# Patient Record
Sex: FEMALE | Race: OTHER
[De-identification: names, ages, dates, MRNs, and addresses within clinical notes are randomized per-mention and may not be internally consistent; named-entity substitution may affect disease eponyms.]

---

## 2017-07-20 ENCOUNTER — APPOINTMENT (OUTPATIENT)
Dept: PULMONOLOGY | Facility: CLINIC | Age: 72
End: 2017-07-20

## 2017-07-20 DIAGNOSIS — J18.9 PNEUMONIA, UNSPECIFIED ORGANISM: ICD-10-CM

## 2017-10-03 ENCOUNTER — OTHER (OUTPATIENT)
Age: 72
End: 2017-10-03

## 2017-10-12 ENCOUNTER — APPOINTMENT (OUTPATIENT)
Dept: PULMONOLOGY | Facility: CLINIC | Age: 72
End: 2017-10-12

## 2017-10-27 ENCOUNTER — APPOINTMENT (OUTPATIENT)
Dept: PULMONOLOGY | Facility: CLINIC | Age: 72
End: 2017-10-27
Payer: MEDICARE

## 2017-10-27 VITALS
WEIGHT: 125 LBS | OXYGEN SATURATION: 93 % | HEIGHT: 66 IN | TEMPERATURE: 97.4 F | DIASTOLIC BLOOD PRESSURE: 72 MMHG | BODY MASS INDEX: 20.09 KG/M2 | SYSTOLIC BLOOD PRESSURE: 100 MMHG | HEART RATE: 86 BPM

## 2017-10-27 DIAGNOSIS — J30.9 ALLERGIC RHINITIS, UNSPECIFIED: ICD-10-CM

## 2017-10-27 PROCEDURE — 94010 BREATHING CAPACITY TEST: CPT

## 2017-10-27 PROCEDURE — 71020: CPT

## 2017-10-27 PROCEDURE — 99214 OFFICE O/P EST MOD 30 MIN: CPT | Mod: 25

## 2018-01-08 ENCOUNTER — APPOINTMENT (OUTPATIENT)
Dept: PULMONOLOGY | Facility: CLINIC | Age: 73
End: 2018-01-08

## 2018-06-07 ENCOUNTER — MESSAGE (OUTPATIENT)
Age: 73
End: 2018-06-07

## 2018-06-08 ENCOUNTER — APPOINTMENT (OUTPATIENT)
Dept: PULMONOLOGY | Facility: CLINIC | Age: 73
End: 2018-06-08
Payer: MEDICARE

## 2018-06-08 VITALS
OXYGEN SATURATION: 93 % | SYSTOLIC BLOOD PRESSURE: 100 MMHG | HEART RATE: 78 BPM | DIASTOLIC BLOOD PRESSURE: 70 MMHG | BODY MASS INDEX: 19.29 KG/M2 | WEIGHT: 120 LBS | HEIGHT: 66 IN | TEMPERATURE: 97.6 F

## 2018-06-08 DIAGNOSIS — J47.1 BRONCHIECTASIS WITH (ACUTE) EXACERBATION: ICD-10-CM

## 2018-06-08 PROCEDURE — 94010 BREATHING CAPACITY TEST: CPT

## 2018-06-08 PROCEDURE — 99214 OFFICE O/P EST MOD 30 MIN: CPT | Mod: 25

## 2018-06-08 RX ORDER — LEVOFLOXACIN 500 MG/1
500 TABLET, FILM COATED ORAL
Qty: 7 | Refills: 0 | Status: DISCONTINUED | COMMUNITY
Start: 2017-07-14 | End: 2018-06-08

## 2018-06-08 RX ORDER — AZITHROMYCIN 250 MG/1
250 TABLET, FILM COATED ORAL
Qty: 6 | Refills: 0 | Status: DISCONTINUED | COMMUNITY
Start: 2017-12-30 | End: 2018-06-08

## 2018-06-12 ENCOUNTER — MESSAGE (OUTPATIENT)
Age: 73
End: 2018-06-12

## 2018-07-11 ENCOUNTER — APPOINTMENT (OUTPATIENT)
Dept: PULMONOLOGY | Facility: CLINIC | Age: 73
End: 2018-07-11
Payer: MEDICARE

## 2018-07-11 VITALS
TEMPERATURE: 98.9 F | OXYGEN SATURATION: 92 % | SYSTOLIC BLOOD PRESSURE: 82 MMHG | HEART RATE: 92 BPM | BODY MASS INDEX: 19.29 KG/M2 | WEIGHT: 120 LBS | DIASTOLIC BLOOD PRESSURE: 64 MMHG | HEIGHT: 66 IN

## 2018-07-11 PROCEDURE — 94010 BREATHING CAPACITY TEST: CPT

## 2018-07-11 PROCEDURE — 71046 X-RAY EXAM CHEST 2 VIEWS: CPT

## 2018-07-11 PROCEDURE — 99214 OFFICE O/P EST MOD 30 MIN: CPT | Mod: 25

## 2018-07-11 RX ORDER — ALBUTEROL SULFATE 90 UG/1
108 (90 BASE) AEROSOL, METERED RESPIRATORY (INHALATION)
Qty: 18 | Refills: 0 | Status: DISCONTINUED | COMMUNITY
Start: 2017-12-30 | End: 2018-07-11

## 2018-07-11 RX ORDER — LORATADINE 5 MG/5 ML
10-240 SOLUTION, ORAL ORAL DAILY
Qty: 30 | Refills: 1 | Status: DISCONTINUED | COMMUNITY
Start: 2017-10-27 | End: 2018-07-11

## 2018-07-11 RX ORDER — OXYCODONE AND ACETAMINOPHEN 5; 325 MG/1; MG/1
5-325 TABLET ORAL
Qty: 60 | Refills: 0 | Status: DISCONTINUED | COMMUNITY
Start: 2018-04-20 | End: 2018-07-11

## 2018-07-11 RX ORDER — LEVOFLOXACIN 500 MG/1
500 TABLET, FILM COATED ORAL DAILY
Qty: 7 | Refills: 0 | Status: DISCONTINUED | COMMUNITY
Start: 2018-06-07 | End: 2018-07-11

## 2018-07-11 RX ORDER — TRIAMCINOLONE ACETONIDE 1 MG/G
0.1 CREAM TOPICAL
Qty: 30 | Refills: 0 | Status: DISCONTINUED | COMMUNITY
Start: 2018-01-09 | End: 2018-07-11

## 2018-07-11 RX ORDER — HYDROCODONE BITARTRATE AND HOMATROPINE METHYLBROMIDE 5; 1.5 MG/5ML; MG/5ML
5-1.5 SOLUTION ORAL
Qty: 120 | Refills: 0 | Status: DISCONTINUED | COMMUNITY
Start: 2017-10-27 | End: 2018-07-11

## 2018-07-11 RX ORDER — ALCLOMETASONE DIPROPIONATE 0.5 MG/G
0.05 CREAM TOPICAL
Qty: 30 | Refills: 0 | Status: DISCONTINUED | COMMUNITY
Start: 2017-12-21 | End: 2018-07-11

## 2018-07-11 RX ORDER — SODIUM FLUORIDE 6 MG/ML
1.1 PASTE, DENTIFRICE DENTAL
Qty: 100 | Refills: 0 | Status: DISCONTINUED | COMMUNITY
Start: 2017-06-06 | End: 2018-07-11

## 2018-07-11 RX ORDER — NAPROXEN 500 MG/1
500 TABLET ORAL
Qty: 30 | Refills: 0 | Status: DISCONTINUED | COMMUNITY
Start: 2018-04-24 | End: 2018-07-11

## 2018-07-12 ENCOUNTER — TRANSCRIPTION ENCOUNTER (OUTPATIENT)
Age: 73
End: 2018-07-12

## 2018-07-12 ENCOUNTER — APPOINTMENT (OUTPATIENT)
Dept: PULMONOLOGY | Facility: CLINIC | Age: 73
End: 2018-07-12
Payer: MEDICARE

## 2018-07-12 PROCEDURE — 94729 DIFFUSING CAPACITY: CPT

## 2018-07-12 PROCEDURE — 94727 GAS DIL/WSHOT DETER LNG VOL: CPT

## 2018-07-12 PROCEDURE — 94060 EVALUATION OF WHEEZING: CPT

## 2018-08-06 ENCOUNTER — APPOINTMENT (OUTPATIENT)
Dept: PULMONOLOGY | Facility: CLINIC | Age: 73
End: 2018-08-06
Payer: MEDICARE

## 2018-08-06 PROCEDURE — 94640 AIRWAY INHALATION TREATMENT: CPT

## 2018-08-23 ENCOUNTER — APPOINTMENT (OUTPATIENT)
Dept: PULMONOLOGY | Facility: CLINIC | Age: 73
End: 2018-08-23
Payer: MEDICARE

## 2018-08-23 VITALS
OXYGEN SATURATION: 90 % | HEART RATE: 80 BPM | DIASTOLIC BLOOD PRESSURE: 70 MMHG | BODY MASS INDEX: 19.29 KG/M2 | SYSTOLIC BLOOD PRESSURE: 100 MMHG | TEMPERATURE: 97.7 F | WEIGHT: 120 LBS | HEIGHT: 66 IN

## 2018-08-23 PROCEDURE — 99213 OFFICE O/P EST LOW 20 MIN: CPT | Mod: 25

## 2018-08-23 PROCEDURE — 94640 AIRWAY INHALATION TREATMENT: CPT | Mod: 59

## 2018-08-23 PROCEDURE — 94010 BREATHING CAPACITY TEST: CPT

## 2018-08-24 ENCOUNTER — LABORATORY RESULT (OUTPATIENT)
Age: 73
End: 2018-08-24

## 2018-08-28 ENCOUNTER — RX RENEWAL (OUTPATIENT)
Age: 73
End: 2018-08-28

## 2018-08-28 LAB — BACTERIA SPT CULT: ABNORMAL

## 2018-09-06 LAB — FUNGUS SPT CULT: ABNORMAL

## 2018-09-24 LAB — FUNGUS SPT CULT: NORMAL

## 2018-10-03 ENCOUNTER — CLINICAL ADVICE (OUTPATIENT)
Age: 73
End: 2018-10-03

## 2018-10-03 LAB — ACID FAST STN SPT: ABNORMAL

## 2018-10-15 LAB — ACID FAST STN SPT: NORMAL

## 2018-11-02 ENCOUNTER — APPOINTMENT (OUTPATIENT)
Dept: PULMONOLOGY | Facility: CLINIC | Age: 73
End: 2018-11-02
Payer: MEDICARE

## 2018-11-02 VITALS
OXYGEN SATURATION: 93 % | WEIGHT: 120 LBS | DIASTOLIC BLOOD PRESSURE: 80 MMHG | TEMPERATURE: 98.2 F | HEIGHT: 66 IN | HEART RATE: 80 BPM | SYSTOLIC BLOOD PRESSURE: 100 MMHG | BODY MASS INDEX: 19.29 KG/M2

## 2018-11-02 PROCEDURE — 99213 OFFICE O/P EST LOW 20 MIN: CPT | Mod: 25

## 2018-11-02 PROCEDURE — 71046 X-RAY EXAM CHEST 2 VIEWS: CPT

## 2018-11-27 ENCOUNTER — OUTPATIENT (OUTPATIENT)
Dept: OUTPATIENT SERVICES | Facility: HOSPITAL | Age: 73
LOS: 1 days | Discharge: HOME | End: 2018-11-27
Payer: MEDICARE

## 2018-11-27 ENCOUNTER — RESULT REVIEW (OUTPATIENT)
Age: 73
End: 2018-11-27

## 2018-11-27 PROCEDURE — 31624 DX BRONCHOSCOPE/LAVAGE: CPT

## 2018-11-27 PROCEDURE — 87116 MYCOBACTERIA CULTURE: CPT

## 2018-11-27 PROCEDURE — 87015 SPECIMEN INFECT AGNT CONCNTJ: CPT

## 2018-11-27 PROCEDURE — 87206 SMEAR FLUORESCENT/ACID STAI: CPT

## 2018-11-27 PROCEDURE — 87070 CULTURE OTHR SPECIMN AEROBIC: CPT

## 2018-11-27 PROCEDURE — 88305 TISSUE EXAM BY PATHOLOGIST: CPT

## 2018-11-27 PROCEDURE — 89051 BODY FLUID CELL COUNT: CPT

## 2018-11-27 PROCEDURE — 88112 CYTOPATH CELL ENHANCE TECH: CPT

## 2018-11-28 LAB
B PERT IGG+IGM PNL SER: SIGNIFICANT CHANGE UP
COLOR FLD: SIGNIFICANT CHANGE UP
FLUID INTAKE SUBSTANCE CLASS: SIGNIFICANT CHANGE UP
FLUID SEGMENTED GRANULOCYTES: 67 % — SIGNIFICANT CHANGE UP
LYMPHOCYTES # FLD: 13 % — SIGNIFICANT CHANGE UP
MONOS+MACROS # FLD: 20 % — SIGNIFICANT CHANGE UP
NIGHT BLUE STAIN TISS: SIGNIFICANT CHANGE UP
NIGHT BLUE STAIN TISS: SIGNIFICANT CHANGE UP
RCV VOL RI: HIGH /UL (ref 0–5)
SPECIMEN SOURCE FLD: SIGNIFICANT CHANGE UP
SPECIMEN SOURCE: SIGNIFICANT CHANGE UP
SPECIMEN SOURCE: SIGNIFICANT CHANGE UP
TOTAL NUCLEATED CELL COUNT, BODY FLUID: 2500 /UL — HIGH (ref 0–5)
TUBE TYPE: SIGNIFICANT CHANGE UP

## 2018-11-29 LAB
-  AMOXICILLIN/CLAVULANIC ACID: SIGNIFICANT CHANGE UP
-  CIPROFLOXACIN: SIGNIFICANT CHANGE UP
-  TRIMETHOPRIM/SULFAMETHOXAZOLE: SIGNIFICANT CHANGE UP
CULTURE RESULTS: SIGNIFICANT CHANGE UP
CULTURE RESULTS: SIGNIFICANT CHANGE UP
GRAM STN FLD: SIGNIFICANT CHANGE UP
GRAM STN FLD: SIGNIFICANT CHANGE UP
METHOD TYPE: SIGNIFICANT CHANGE UP
METHOD TYPE: SIGNIFICANT CHANGE UP
NON-GYNECOLOGICAL CYTOLOGY STUDY: SIGNIFICANT CHANGE UP
NON-GYNECOLOGICAL CYTOLOGY STUDY: SIGNIFICANT CHANGE UP
ORGANISM # SPEC MICROSCOPIC CNT: SIGNIFICANT CHANGE UP
SPECIMEN SOURCE: SIGNIFICANT CHANGE UP
SPECIMEN SOURCE: SIGNIFICANT CHANGE UP

## 2018-11-30 ENCOUNTER — CLINICAL ADVICE (OUTPATIENT)
Age: 73
End: 2018-11-30

## 2018-12-14 ENCOUNTER — APPOINTMENT (OUTPATIENT)
Dept: PULMONOLOGY | Facility: CLINIC | Age: 73
End: 2018-12-14
Payer: MEDICARE

## 2018-12-14 VITALS
HEIGHT: 66 IN | DIASTOLIC BLOOD PRESSURE: 68 MMHG | TEMPERATURE: 97.9 F | WEIGHT: 104.25 LBS | SYSTOLIC BLOOD PRESSURE: 94 MMHG | BODY MASS INDEX: 16.76 KG/M2 | OXYGEN SATURATION: 93 % | HEART RATE: 80 BPM

## 2018-12-14 PROCEDURE — 36415 COLL VENOUS BLD VENIPUNCTURE: CPT

## 2018-12-14 PROCEDURE — 99214 OFFICE O/P EST MOD 30 MIN: CPT | Mod: 25

## 2018-12-14 PROCEDURE — 71046 X-RAY EXAM CHEST 2 VIEWS: CPT

## 2018-12-14 RX ORDER — AMOXICILLIN AND CLAVULANATE POTASSIUM 875; 125 MG/1; MG/1
875-125 TABLET, COATED ORAL
Qty: 28 | Refills: 0 | Status: DISCONTINUED | COMMUNITY
Start: 2018-11-30 | End: 2018-12-14

## 2018-12-17 LAB
ALBUMIN SERPL ELPH-MCNC: 4 G/DL
ALP BLD-CCNC: 53 U/L
ALT SERPL-CCNC: 18 U/L
ANION GAP SERPL CALC-SCNC: 11 MMOL/L
AST SERPL-CCNC: 25 U/L
BASOPHILS # BLD AUTO: 0.03 K/UL
BASOPHILS NFR BLD AUTO: 0.6 %
BILIRUB SERPL-MCNC: 0.3 MG/DL
BUN SERPL-MCNC: 24 MG/DL
CALCIUM SERPL-MCNC: 9.6 MG/DL
CHLORIDE SERPL-SCNC: 107 MMOL/L
CO2 SERPL-SCNC: 26 MMOL/L
CREAT SERPL-MCNC: 0.79 MG/DL
EOSINOPHIL # BLD AUTO: 0.08 K/UL
EOSINOPHIL NFR BLD AUTO: 1.6 %
GLUCOSE SERPL-MCNC: 61 MG/DL
HCT VFR BLD CALC: 40.7 %
HGB BLD-MCNC: 12.6 G/DL
IMM GRANULOCYTES NFR BLD AUTO: 0.2 %
LYMPHOCYTES # BLD AUTO: 1.85 K/UL
LYMPHOCYTES NFR BLD AUTO: 36.6 %
MAN DIFF?: NORMAL
MCHC RBC-ENTMCNC: 27.8 PG
MCHC RBC-ENTMCNC: 31 GM/DL
MCV RBC AUTO: 89.6 FL
MONOCYTES # BLD AUTO: 0.37 K/UL
MONOCYTES NFR BLD AUTO: 7.3 %
NEUTROPHILS # BLD AUTO: 2.72 K/UL
NEUTROPHILS NFR BLD AUTO: 53.7 %
PLATELET # BLD AUTO: 388 K/UL
POTASSIUM SERPL-SCNC: 4.5 MMOL/L
PROT SERPL-MCNC: 6.8 G/DL
RBC # BLD: 4.54 M/UL
RBC # FLD: 15.5 %
SODIUM SERPL-SCNC: 144 MMOL/L
WBC # FLD AUTO: 5.06 K/UL

## 2018-12-18 ENCOUNTER — RX RENEWAL (OUTPATIENT)
Age: 73
End: 2018-12-18

## 2019-01-15 LAB
CULTURE RESULTS: SIGNIFICANT CHANGE UP
CULTURE RESULTS: SIGNIFICANT CHANGE UP
SPECIMEN SOURCE: SIGNIFICANT CHANGE UP
SPECIMEN SOURCE: SIGNIFICANT CHANGE UP

## 2019-01-25 ENCOUNTER — APPOINTMENT (OUTPATIENT)
Dept: PULMONOLOGY | Facility: CLINIC | Age: 74
End: 2019-01-25
Payer: MEDICARE

## 2019-01-25 VITALS
SYSTOLIC BLOOD PRESSURE: 100 MMHG | HEART RATE: 76 BPM | HEIGHT: 66 IN | BODY MASS INDEX: 16.71 KG/M2 | WEIGHT: 104 LBS | DIASTOLIC BLOOD PRESSURE: 80 MMHG | OXYGEN SATURATION: 93 % | TEMPERATURE: 97.8 F

## 2019-01-25 PROCEDURE — 94010 BREATHING CAPACITY TEST: CPT

## 2019-01-25 PROCEDURE — 71046 X-RAY EXAM CHEST 2 VIEWS: CPT

## 2019-01-25 PROCEDURE — 36415 COLL VENOUS BLD VENIPUNCTURE: CPT

## 2019-01-25 PROCEDURE — 99214 OFFICE O/P EST MOD 30 MIN: CPT | Mod: 25

## 2019-01-25 NOTE — HISTORY OF PRESENT ILLNESS
[FreeTextEntry1] : 5/26/15: Ms. Resendez comes in for routine checkup. She endorses worsened allergy symptoms, particularly ocular and nasal symptoms. She denies any dyspnea, cough, or sputum production. She has an Acapella valve which she is not really using and doesn't remember how to use. However, she relates that when she uses it she does not really bring up any sputum.\par \par 10/14/15: Here for follow up after chest CT. Last month a little more dyspneic. No cough or sputum. But used to walk 2.5 miles and can't do that as easily any more. Otherwise doing well. Stopped using Acapella as didn't produce any sputum with it.\par \par 16: Was fine and then on Saturday suddenly coughed up 1/4-1/3 cup of bright red blood total throughout the evening. No fever, dyspnea, chest pain. No further hemoptysis but has continued to cough. Bringing up just scant phlegm with that cough. Feels a little congested but otherwise not sick, just a little congested, no fever, no chest pain, no dyspnea. BP low - eating and drinking fine, no N/V/D. Gave her cefdinir for exacerbation of bronchiectasis.\par \par 16: Saturday developed cough, sensation of phlegm she couldn't expectorate, says slight fever at [facility], no chest pain, dyspnea, + fatigue. Went to [facility] where CXR read as lingula/retrocardiac infiltrate. Put on Levaquin 500mg x 7 days, feeling better.\par \par 10/31/16: Comes in for follow up after recent CT. No clinical change. No significant cough or dyspnea. Rhinorrhea.\par \par 17: Seen and examined by me w Dr Keith. Developed mild sore throat and nonproductive cough ~7/10, saw [facility]  and given clinical dx of pneumonia without CXR, given Levaquin x 7 days. Cough resolved but continues to feel fatigued. Appetite improving. No fever, chest pain, dyspnea. Chest exam unchanged - scattered squeaks. Whether or not she had pneumonia, she was treated for bacterial pathogens and is improving. Advised to give it more time. Call if worsening/fever/dyspnea/etc. Due for CT in 10/2017.\par \par 10/27/17: Last 3 weeks dry cough, no sputum, no fever. Feels well. Mom  2 weeks ago. And  just got a PPM emergently for bradycardia. May allergies but not fall. On Robitussin. Mild nasal symptoms just w weather.\par \par 18: Called yesterday with nocturnal hemoptysis for 2 nights (now 3 nights). Coughing up about a tablespoon of blood at a time only when she lies down. No new daytime cough or sputum production. Mildly fatigued initially but getting worse as she has trouble sleeping. No fever. No orthopnea. Started Levaquin yesterday.\par \par 18: Hemoptysis resolved on the Levaquin but she remains very fatigued and more dyspneic than usual. Was able to travel to [facility] w grandchild but took it easy. No fever. No chest pain.\par \par 18: In the interim she had a repeat scan and came in for sputum induction. She did not improve on the Breo. She comes in today still w dyspnea on 4-5 blocks. She is fatigued. She has lost 10 lbs / 2 weeks despite maintaining same eating habits. Slightly more constipated than usual, no blood, UTD on cscope. Cough is minimal. -- Grew Pseudomonas and tried treating w Levaquin but there was no improvement and in fact she felt markedly worse on Levaquin.\par \par 18: Losing weight despite eating ice cream, down about 15 lbs. Remains dyspneic, really fatigued, has to sit and rest to bake. Found to have a breast papilloma on ultrasound and needs to have it removed - I spoke to Dr Reed. This is next Wednesday, Dr Darwin Garcia at [facility]. Continues to have this rippling, tingling feeling in her scalp that she has reported to me before. 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 The Augmentin seemed to help, less cough in past couple of days, more clear headed. Lost another lb. Generally a bit better. But down 20 lbs since we met and 15 this year.\par \par 19: Started on . Feels no better. Some inconsistent GI side effects. Remains very fatigued. Eating. Weight is stable for this month. No rash or vision change. Notes a color change to her toes.

## 2019-01-25 NOTE — CONSULT LETTER
[Dear  ___] : Dear  [unfilled], [Courtesy Letter:] : I had the pleasure of seeing your patient, [unfilled], in my office today. [Please see my note below.] : Please see my note below. [Consult Closing:] : Thank you very much for allowing me to participate in the care of this patient.  If you have any questions, please do not hesitate to contact me. [Sincerely,] : Sincerely, [FreeTextEntry2] : Rajinder Baez MD\par [address]\[place] [address] [FreeTextEntry3] : Radha Ramos MD [DrKali  ___] : Dr. DUPREE

## 2019-01-25 NOTE — ASSESSMENT
[FreeTextEntry1] : Data reviewed: \par \par CT chest [facility] 7/17/18: 1. New confluent opacity in the lower lobes, right greater than left. This could represent new atelectasis/mucoid impaction. A follow-up noncontrast CT of the chest in 3-6 months is recommended to ensure stability. 2. Increasing tree-in-bud nodules in right lower lobe. 3. Stable opacity in superior segment of right lower lobe.\par \par PA/lat CXR in office 12/14/18: diffusely increased markings but no sig change \par PA/lat CXR in office 1/25/19: no sig change, slightly better at R base if anything\par \par Spirometry always restricted\par PFT 7/12/18: severe complex restrictive abnormality, no BD response, DLCO 60%\par [place] 1/25/19: restricted, FEV1 33%\par \par SpCx 8/6/18: bacterial cancelled / rare Penicillium sp & numerous Paecilomyces variotii / rare MAC\par SpCx 8/23/18: AFB neg\par SpCx 8/24/18: mixed GNR incl Pseudomonas putis /  fungus neg / AFB neg\par Bronch 11/27/18: 67% polys, 13% lymphs / >100k Moraxella in BAL / MAC in BAL and BW\par \par Impression:\par 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 \par Continue follow up w audiology and opthalmology. Notes appreciated.\par Feet - does not appear to be a drug reaction - Raynaud's? Can send serologies next time.\par RTO 1 month, call if worsening.

## 2019-01-25 NOTE — PHYSICAL EXAM
[General Appearance - In No Acute Distress] : no acute distress [Heart Sounds] : normal S1 and S2 [Murmurs] : no murmurs present [Edema] : no peripheral edema present [FreeTextEntry1] : toes are purplish-red, blanching, pulses are intact, feet cool but toes not cold, no sloughing

## 2019-01-28 LAB
ALBUMIN SERPL ELPH-MCNC: 4.1 G/DL
ALBUMIN SERPL ELPH-MCNC: 4.3 G/DL
ALP BLD-CCNC: 48 U/L
ALP BLD-CCNC: 50 U/L
ALT SERPL-CCNC: 14 U/L
ALT SERPL-CCNC: 18 U/L
ANION GAP SERPL CALC-SCNC: 12 MMOL/L
AST SERPL-CCNC: 23 U/L
AST SERPL-CCNC: 24 U/L
BASOPHILS # BLD AUTO: 0.03 K/UL
BASOPHILS NFR BLD AUTO: 0.5 %
BILIRUB DIRECT SERPL-MCNC: 0.1 MG/DL
BILIRUB INDIRECT SERPL-MCNC: 0.2 MG/DL
BILIRUB SERPL-MCNC: 0.2 MG/DL
BILIRUB SERPL-MCNC: 0.2 MG/DL
BUN SERPL-MCNC: 18 MG/DL
CALCIUM SERPL-MCNC: 9.4 MG/DL
CHLORIDE SERPL-SCNC: 104 MMOL/L
CO2 SERPL-SCNC: 25 MMOL/L
CREAT SERPL-MCNC: 0.68 MG/DL
EOSINOPHIL # BLD AUTO: 0.02 K/UL
EOSINOPHIL NFR BLD AUTO: 0.4 %
GLUCOSE SERPL-MCNC: 91 MG/DL
HCT VFR BLD CALC: 44.8 %
HGB BLD-MCNC: 14.4 G/DL
IMM GRANULOCYTES NFR BLD AUTO: 0 %
LYMPHOCYTES # BLD AUTO: 1.38 K/UL
LYMPHOCYTES NFR BLD AUTO: 24.6 %
MAN DIFF?: NORMAL
MCHC RBC-ENTMCNC: 29.3 PG
MCHC RBC-ENTMCNC: 32.1 GM/DL
MCV RBC AUTO: 91.2 FL
MONOCYTES # BLD AUTO: 0.39 K/UL
MONOCYTES NFR BLD AUTO: 7 %
NEUTROPHILS # BLD AUTO: 3.79 K/UL
NEUTROPHILS NFR BLD AUTO: 67.5 %
PLATELET # BLD AUTO: 243 K/UL
POTASSIUM SERPL-SCNC: 4.8 MMOL/L
PROT SERPL-MCNC: 6.9 G/DL
PROT SERPL-MCNC: 7 G/DL
RBC # BLD: 4.91 M/UL
RBC # FLD: 16.5 %
SODIUM SERPL-SCNC: 141 MMOL/L
TSH SERPL-ACNC: 3.94 UIU/ML
WBC # FLD AUTO: 5.61 K/UL

## 2019-02-25 ENCOUNTER — APPOINTMENT (OUTPATIENT)
Dept: PULMONOLOGY | Facility: CLINIC | Age: 74
End: 2019-02-25
Payer: MEDICARE

## 2019-02-25 VITALS
TEMPERATURE: 97.7 F | WEIGHT: 107 LBS | BODY MASS INDEX: 17.19 KG/M2 | HEART RATE: 71 BPM | HEIGHT: 66 IN | OXYGEN SATURATION: 93 % | DIASTOLIC BLOOD PRESSURE: 74 MMHG | SYSTOLIC BLOOD PRESSURE: 98 MMHG

## 2019-02-25 PROCEDURE — 99213 OFFICE O/P EST LOW 20 MIN: CPT | Mod: 25

## 2019-02-25 PROCEDURE — 36415 COLL VENOUS BLD VENIPUNCTURE: CPT

## 2019-02-25 PROCEDURE — 71046 X-RAY EXAM CHEST 2 VIEWS: CPT

## 2019-02-25 NOTE — REVIEW OF SYSTEMS
[Fatigue] : fatigue [Recent Wt Gain (___ Lbs)] : recent [unfilled] ~Ulb weight gain [As Noted in HPI] : as noted in HPI [Negative] : Cardiovascular

## 2019-02-25 NOTE — HISTORY OF PRESENT ILLNESS
[FreeTextEntry1] : 5/26/15: Ms. Resendez comes in for routine checkup. She endorses worsened allergy symptoms, particularly ocular and nasal symptoms. She denies any dyspnea, cough, or sputum production. She has an Acapella valve which she is not really using and doesn't remember how to use. However, she relates that when she uses it she does not really bring up any sputum.\par \par 10/14/15: Here for follow up after chest CT. Last month a little more dyspneic. No cough or sputum. But used to walk 2.5 miles and can't do that as easily any more. Otherwise doing well. Stopped using Acapella as didn't produce any sputum with it.\par \par 16: Was fine and then on Saturday suddenly coughed up 1/4-1/3 cup of bright red blood total throughout the evening. No fever, dyspnea, chest pain. No further hemoptysis but has continued to cough. Bringing up just scant phlegm with that cough. Feels a little congested but otherwise not sick, just a little congested, no fever, no chest pain, no dyspnea. BP low - eating and drinking fine, no N/V/D. Gave her cefdinir for exacerbation of bronchiectasis.\par \par 16: Saturday developed cough, sensation of phlegm she couldn't expectorate, says slight fever at [facility], no chest pain, dyspnea, + fatigue. Went to [facility] where CXR read as lingula/retrocardiac infiltrate. Put on Levaquin 500mg x 7 days, feeling better.\par \par 10/31/16: Comes in for follow up after recent CT. No clinical change. No significant cough or dyspnea. Rhinorrhea.\par \par 17: Seen and examined by me w Dr Keith. Developed mild sore throat and nonproductive cough ~7/10, saw [facility]  and given clinical dx of pneumonia without CXR, given Levaquin x 7 days. Cough resolved but continues to feel fatigued. Appetite improving. No fever, chest pain, dyspnea. Chest exam unchanged - scattered squeaks. Whether or not she had pneumonia, she was treated for bacterial pathogens and is improving. Advised to give it more time. Call if worsening/fever/dyspnea/etc. Due for CT in 10/2017.\par \par 10/27/17: Last 3 weeks dry cough, no sputum, no fever. Feels well. Mom  2 weeks ago. And  just got a PPM emergently for bradycardia. May allergies but not fall. On Robitussin. Mild nasal symptoms just w weather.\par \par 18: Called yesterday with nocturnal hemoptysis for 2 nights (now 3 nights). Coughing up about a tablespoon of blood at a time only when she lies down. No new daytime cough or sputum production. Mildly fatigued initially but getting worse as she has trouble sleeping. No fever. No orthopnea. Started Levaquin yesterday.\par \par 18: Hemoptysis resolved on the Levaquin but she remains very fatigued and more dyspneic than usual. Was able to travel to [facility] w grandchild but took it easy. No fever. No chest pain.\par \par 18: In the interim she had a repeat scan and came in for sputum induction. She did not improve on the Breo. She comes in today still w dyspnea on 4-5 blocks. She is fatigued. She has lost 10 lbs / 2 weeks despite maintaining same eating habits. Slightly more constipated than usual, no blood, UTD on cscope. Cough is minimal. -- Grew Pseudomonas and tried treating w Levaquin but there was no improvement and in fact she felt markedly worse on Levaquin.\par \par 18: Losing weight despite eating ice cream, down about 15 lbs. Remains dyspneic, really fatigued, has to sit and rest to bake. Found to have a breast papilloma on ultrasound and needs to have it removed - I spoke to Dr Reed. This is next Wednesday, Dr Darwin Garcia at [facility]. Continues to have this rippling, tingling feeling in her scalp that she has reported to me before. 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 The Augmentin seemed to help, less cough in past couple of days, more clear headed. Lost another lb. Generally a bit better. But down 20 lbs since we met and 15 this year.\par \par 19: Started on . Feels no better. Some inconsistent GI side effects. Remains very fatigued. Eating. Weight is stable for this month. No rash or vision change. Notes a color change to her toes.\par \par 19: 2-3 days of feeling like herself, so minimally better than last time. Some blotches on skin, feet still red. And dentist notices more plaque on teeth. Has gained 5 lbs, 102 to 107 lbs.

## 2019-02-25 NOTE — ASSESSMENT
[FreeTextEntry1] : Data reviewed: \par \par CT chest [facility] 7/17/18: 1. New confluent opacity in the lower lobes, right greater than left. This could represent new atelectasis/mucoid impaction. A follow-up noncontrast CT of the chest in 3-6 months is recommended to ensure stability. 2. Increasing tree-in-bud nodules in right lower lobe. 3. Stable opacity in superior segment of right lower lobe.\par \par PA/lat CXR in office 12/14/18: diffusely increased markings but no sig change \par PA/lat CXR in office 1/25/19: no sig change, slightly better at R base if anything\par PA/lat CXR in office 2/25/19: again slightly better at R base, no sig change\par \par Spirometry always restricted\par PFT 7/12/18: severe complex restrictive abnormality, no BD response, DLCO 60%\par [place] 1/25/19: restricted, FEV1 33%\par \par SpCx 8/6/18: bacterial cancelled / rare Penicillium sp & numerous Paecilomyces variotii / rare MAC\par SpCx 8/23/18: AFB neg\par SpCx 8/24/18: mixed GNR incl Pseudomonas putis /  fungus neg / AFB neg\par Bronch 11/27/18: 67% polys, 13% lymphs / >100k Moraxella in BAL / MAC in BAL and BW\par SpCx 1/25/19: AFB neg to date\par \par Impression:\par Bronchiectasis w MAC on tiwk tx since 12/21/18\par \par Plan:\par Continue tiwk MAC tx as follows:\par -azithromycin 500 mg tiwk\par -rifampin 600 mg tiwk\par -ethambutol 25 mg/kg tiwk / 104 lbs = 47kg / 1200 mg\par Labs checked today incl UZIEL. \par There is no drug reaction between alendronate and her MAC regimen.\par The rash I do not think is drug eruption. She sees Dr Caitlin Hewitt - asked her to follow up w her, but continue tx.\par Cont ophtho, audiology follow up.\par RTO 1 month, call if worsening.

## 2019-02-25 NOTE — PHYSICAL EXAM
[General Appearance - In No Acute Distress] : no acute distress [Heart Sounds] : normal S1 and S2 [Murmurs] : no murmurs present [Edema] : no peripheral edema present [Abdomen Tenderness] : non-tender [FreeTextEntry1] : some dry patches on arms

## 2019-02-26 LAB
ALBUMIN SERPL ELPH-MCNC: 4.3 G/DL
ALP BLD-CCNC: 48 U/L
ALT SERPL-CCNC: 21 U/L
ANION GAP SERPL CALC-SCNC: 12 MMOL/L
AST SERPL-CCNC: 27 U/L
BASOPHILS # BLD AUTO: 0.04 K/UL
BASOPHILS NFR BLD AUTO: 0.8 %
BILIRUB SERPL-MCNC: 0.2 MG/DL
BUN SERPL-MCNC: 19 MG/DL
CALCIUM SERPL-MCNC: 9.8 MG/DL
CHLORIDE SERPL-SCNC: 103 MMOL/L
CO2 SERPL-SCNC: 26 MMOL/L
CREAT SERPL-MCNC: 0.72 MG/DL
EOSINOPHIL # BLD AUTO: 0.06 K/UL
EOSINOPHIL NFR BLD AUTO: 1.3 %
GLUCOSE SERPL-MCNC: 92 MG/DL
HCT VFR BLD CALC: 43.7 %
HGB BLD-MCNC: 13.7 G/DL
IMM GRANULOCYTES NFR BLD AUTO: 0.2 %
LYMPHOCYTES # BLD AUTO: 1.48 K/UL
LYMPHOCYTES NFR BLD AUTO: 31.1 %
MAN DIFF?: NORMAL
MCHC RBC-ENTMCNC: 28.6 PG
MCHC RBC-ENTMCNC: 31.4 GM/DL
MCV RBC AUTO: 91.2 FL
MONOCYTES # BLD AUTO: 0.32 K/UL
MONOCYTES NFR BLD AUTO: 6.7 %
NEUTROPHILS # BLD AUTO: 2.85 K/UL
NEUTROPHILS NFR BLD AUTO: 59.9 %
PLATELET # BLD AUTO: 213 K/UL
POTASSIUM SERPL-SCNC: 4.2 MMOL/L
PROT SERPL-MCNC: 6.8 G/DL
RBC # BLD: 4.79 M/UL
RBC # FLD: 15.4 %
SODIUM SERPL-SCNC: 141 MMOL/L
WBC # FLD AUTO: 4.76 K/UL

## 2019-02-28 LAB — ANA SER IF-ACNC: NEGATIVE

## 2019-03-25 LAB — ACID FAST STN SPT: NORMAL

## 2019-03-31 ENCOUNTER — TRANSCRIPTION ENCOUNTER (OUTPATIENT)
Age: 74
End: 2019-03-31

## 2019-04-02 ENCOUNTER — APPOINTMENT (OUTPATIENT)
Dept: PULMONOLOGY | Facility: CLINIC | Age: 74
End: 2019-04-02
Payer: MEDICARE

## 2019-04-02 VITALS
DIASTOLIC BLOOD PRESSURE: 68 MMHG | WEIGHT: 104 LBS | OXYGEN SATURATION: 97 % | SYSTOLIC BLOOD PRESSURE: 90 MMHG | HEIGHT: 66 IN | BODY MASS INDEX: 16.71 KG/M2 | TEMPERATURE: 97.9 F | HEART RATE: 77 BPM

## 2019-04-02 PROCEDURE — 94640 AIRWAY INHALATION TREATMENT: CPT | Mod: 59

## 2019-04-02 PROCEDURE — 99213 OFFICE O/P EST LOW 20 MIN: CPT | Mod: 25

## 2019-04-02 PROCEDURE — 94010 BREATHING CAPACITY TEST: CPT

## 2019-04-02 PROCEDURE — 36415 COLL VENOUS BLD VENIPUNCTURE: CPT

## 2019-04-02 NOTE — PHYSICAL EXAM
[General Appearance - In No Acute Distress] : no acute distress [Heart Sounds] : normal S1 and S2 [Murmurs] : no murmurs present [Edema] : no peripheral edema present [FreeTextEntry1] : few squeaks

## 2019-04-02 NOTE — HISTORY OF PRESENT ILLNESS
[FreeTextEntry1] : Cared for by me since 2015.\par \par 1/6/16: Cefdinir for exacerbation of bronchiectasis.\par 2/24/16: Levaquin 500mg x 7 days for pna dx'd at [facility].\par 7/20/17: Again given Levaquin x 7 days by [facility] for infectious symptoms.\par 6/8/18: Hemoptysis, Levaquin.\par 8/23/18: Seen for dyspnea that did not respond to Breo, weight loss, cough.Grew Pseudomonas and tried treating w Levaquin but there was no improvement and in fact she felt markedly worse on Levaquin.\par \par 11/2/18: Losing weight despite eating ice cream, down about 15 lbs. Remains dyspneic, really fatigued, has to sit and rest to bake. Found to have a breast papilloma on ultrasound and needs to have it removed - I spoke to Dr Reed. This is next Wednesday, Dr Darwin Garcia at [facility]. Continues to have this rippling, tingling feeling in her scalp that she has reported to me before. 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 The Augmentin seemed to help, less cough in past couple of days, more clear headed. Lost another lb. Generally a bit better. But down 20 lbs since we met and 15 this year.\par \par 1/25/19: Started on 12/21. Feels no better. Some inconsistent GI side effects. Remains very fatigued. Eating. Weight is stable for this month. No rash or vision change. Notes a color change to her toes.\par \par 2/25/19: 2-3 days of feeling like herself, so minimally better than last time. Some blotches on skin, feet still red. And dentist notices more plaque on teeth. Has gained 5 lbs, 102 to 107 lbs.\par \par 4/2/19: Doing ok. Weight stable. A bit more energy. No new side effects. Appetite is ok. Still not particularly active.  slowly recovering, not doing that great w cardiac amyloid.

## 2019-04-02 NOTE — ASSESSMENT
[FreeTextEntry1] : Data reviewed: \par \par CT chest [facility] 7/17/18: 1. New confluent opacity in the lower lobes, right greater than left. This could represent new atelectasis/mucoid impaction. A follow-up noncontrast CT of the chest in 3-6 months is recommended to ensure stability. 2. Increasing tree-in-bud nodules in right lower lobe. 3. Stable opacity in superior segment of right lower lobe.\par \par PA/lat CXR in office 12/14/18: diffusely increased markings but no sig change \par PA/lat CXR in office 1/25/19: no sig change, slightly better at R base if anything\par PA/lat CXR in office 2/25/19: again slightly better at R base, no sig change\par \par Spirometry always restricted\par PFT 7/12/18: severe complex restrictive abnormality, no BD response, DLCO 60%\par [place] 1/25/19: restricted, FEV1 33%\par [place] 4/2/19: restricted, FEV1 38%\par \par SpCx 8/6/18: bacterial cancelled / rare Penicillium sp & numerous Paecilomyces variotii / rare MAC\par SpCx 8/23/18: AFB neg\par SpCx 8/24/18: mixed GNR incl Pseudomonas putis /  fungus neg / AFB neg\par Bronch 11/27/18: 67% polys, 13% lymphs / >100k Moraxella in BAL / MAC in BAL and BW\par SpCx 1/25/19: AFB neg final\par \par Impression:\par Bronchiectasis w MAC on tiwk tx since 12/21/18\par \par Plan:\par Continue tiwk MAC tx as follows:\par -azithromycin 500 mg tiwk\par -rifampin 600 mg tiwk\par -ethambutol 25 mg/kg tiwk / 104 lbs = 47kg / 1200 mg\par Check labs, sputum culture and repeat CT.\par Follow up 1 month.

## 2019-04-03 ENCOUNTER — APPOINTMENT (OUTPATIENT)
Dept: VASCULAR SURGERY | Facility: CLINIC | Age: 74
End: 2019-04-03
Payer: MEDICARE

## 2019-04-03 DIAGNOSIS — Z78.9 OTHER SPECIFIED HEALTH STATUS: ICD-10-CM

## 2019-04-03 LAB
ALBUMIN SERPL ELPH-MCNC: 4.3 G/DL
ALP BLD-CCNC: 47 U/L
ALT SERPL-CCNC: 21 U/L
AST SERPL-CCNC: 26 U/L
BILIRUB DIRECT SERPL-MCNC: 0.1 MG/DL
BILIRUB INDIRECT SERPL-MCNC: 0.1 MG/DL
BILIRUB SERPL-MCNC: 0.2 MG/DL
PROT SERPL-MCNC: 6.7 G/DL

## 2019-04-03 PROCEDURE — 99203 OFFICE O/P NEW LOW 30 MIN: CPT

## 2019-04-08 NOTE — END OF VISIT
[FreeTextEntry3] : All medical record entries made by the Scribe were at my, Dr. Mcrae's direction and personally dictated by me on 04/03/2019 I have reviewed the chart and agree that the record accurately reflects my personal performance of the history, physical exam, assessment and plan. I have also personally directed, reviewed, and agreed with the chart.

## 2019-04-08 NOTE — ASSESSMENT
[FreeTextEntry1] : 73 y/o F with b/l feet. On exam patient has strong pedal palpable pulses and the skin is warm and well perfused, with the discoloration improving with elevation. Suspect mild lower extremity venous insufficiency. No vascular intervention needed at this time. She will follow-up here as needed.

## 2019-04-08 NOTE — CONSULT LETTER
[Dear  ___] : Dear  [unfilled], [Consult Closing:] : Thank you very much for allowing me to participate in the care of this patient.  If you have any questions, please do not hesitate to contact me. [FreeTextEntry2] : Rajinder Braun MD\par [address]\[place] [address]\par \par Carla Huerta MD\par [address]\[place] [address]\par \par Cami Woods MD\par [address]\[place] [address]\par \par Radha Ramos MD\par [address]\[place] [address] [FreeTextEntry1] : I saw your patient Rosy Resendez for evaluation of discolored purplish fingers and toes.  She is otherwise asymptomatic except occasionally notes that her hands become cool.  Denies any claudication, rest pain, or ulcerations.\par \par On exam, she has bilateral easily palpable pedal pulses. Mild cyanosis of the forefeet is noted, especially in the dependent position.  Non-bulging diffusely located venous varices and telangiectasias are present bilaterally.  \par \par Ms Resendez's exam and presentation is consistent with mild venous insufficiency. The cyanosis of the hands and feet could also be related to autonomic dysfunction occasionally seen in patients who have had chemotherapy.  Her legs are well perfused with no evidence of arterial disease. No vascular intervention or further evaluation is needed at this time. She may follow-up here as needed. \par \par My complete EMR office note is below for your records.  [FreeTextEntry3] : Sincerely, \par \par Edgar Mcrae M.D. \par , [facility]\par , Department of Surgery [facility]\par Professor of Surgery, [employer] at [facility]

## 2019-04-08 NOTE — PHYSICAL EXAM
[Normal Breath Sounds] : Normal breath sounds [Respiratory Effort] : normal respiratory effort [Normal Heart Sounds] : normal heart sounds [Murmur] : murmur was appreciated  [2+] : left 2+ [No Rash or Lesion] : No rash or lesion [Alert] : alert [Calm] : calm [JVD] : no jugular venous distention  [Carotid Bruits] : no carotid bruits [Ankle Swelling (On Exam)] : not present [Varicose Veins Of Lower Extremities] : not present [] : not present [Abdomen Tenderness] : ~T ~M No abdominal tenderness [de-identified] : Well appearing, NAD [de-identified] : NCAT [FreeTextEntry1] : discoloration to feet that improves with elevation [de-identified] : FROM

## 2019-04-08 NOTE — HISTORY OF PRESENT ILLNESS
[FreeTextEntry1] : 75 y/o F with history of breast cancer treated with radiation at [facility] in remission, MAC infection on antibiotics, otherwise healthy, no history of heart or lung disease, referred by Dr. Braun for discoloration of her feet and hands. Patient reports discoloration to her b/l toes.  She denies any pain or swelling. She denies any cold feet or numbness. She denies any claudication, rest pain, or ulcerations. She denies any fevers or chills. She is otherwise healthy, followed by pulmonologist for MAC infection. \par \par

## 2019-05-01 ENCOUNTER — APPOINTMENT (OUTPATIENT)
Dept: PULMONOLOGY | Facility: CLINIC | Age: 74
End: 2019-05-01
Payer: MEDICARE

## 2019-05-01 VITALS
HEIGHT: 66 IN | TEMPERATURE: 97.9 F | SYSTOLIC BLOOD PRESSURE: 120 MMHG | HEART RATE: 81 BPM | WEIGHT: 104 LBS | DIASTOLIC BLOOD PRESSURE: 86 MMHG | OXYGEN SATURATION: 90 % | BODY MASS INDEX: 16.71 KG/M2

## 2019-05-01 PROCEDURE — 94010 BREATHING CAPACITY TEST: CPT

## 2019-05-01 PROCEDURE — 36415 COLL VENOUS BLD VENIPUNCTURE: CPT

## 2019-05-01 PROCEDURE — 99213 OFFICE O/P EST LOW 20 MIN: CPT | Mod: 25

## 2019-05-01 NOTE — PHYSICAL EXAM
[General Appearance - In No Acute Distress] : no acute distress [Heart Sounds] : normal S1 and S2 [Edema] : no peripheral edema present [Murmurs] : no murmurs present [FreeTextEntry1] : few squeaks

## 2019-05-01 NOTE — ASSESSMENT
[FreeTextEntry1] : Data reviewed: \par \par CT chest [facility] 4/22/19 personally reviewed:  Interval improvement of right lower lobe and to a lesser extent left lower lobe nodular densities and consolidation likely previously representing superimposed acute bacterial infection. Baseline bibasilar bronchiectasis with chronic wall thickening and scarring and areas of mucous plugging is unchanged in keeping with patient's history of chronic mycobacterial infection.\par \par Spirometry always restricted\par PFT 7/12/18: severe complex restrictive abnormality, no BD response, DLCO 60%\par [place] 1/25/19: restricted, FEV1 33%\par [place] 4/2/19: restricted, FEV1 38%\par \par SpCx 8/6/18: bacterial cancelled / rare Penicillium sp & numerous Paecilomyces variotii / rare MAC\par SpCx 8/23/18: AFB neg\par SpCx 8/24/18: mixed GNR incl Pseudomonas putis /  fungus neg / AFB neg\par Bronch 11/27/18: 67% polys, 13% lymphs / >100k Moraxella in BAL / MAC in BAL and BW\par SpCx 1/25/19: AFB neg final\par SpCx 4/2/19: AFB neg to date\par \par Impression:\par Bronchiectasis w MAC on tiwk tx since 12/21/18\par \par Plan:\par Continue tiwk MAC tx as follows:\par -azithromycin 500 mg tiwk\par -rifampin 600 mg tiwk\par -ethambutol 25 mg/kg tiwk / 104 lbs = 47kg / 1200 mg\par Check labs.\par Follow up 1 month and induce sputum.

## 2019-05-01 NOTE — HISTORY OF PRESENT ILLNESS
[FreeTextEntry1] : Cared for by me since 2015.\par \par 1/6/16: Cefdinir for exacerbation of bronchiectasis.\par 2/24/16: Levaquin 500mg x 7 days for pna dx'd at [facility].\par 7/20/17: Again given Levaquin x 7 days by [facility] for infectious symptoms.\par 6/8/18: Hemoptysis, Levaquin.\par 8/23/18: Seen for dyspnea that did not respond to Breo, weight loss, cough.Grew Pseudomonas and tried treating w Levaquin but there was no improvement and in fact she felt markedly worse on Levaquin.\par \par 11/2/18: Losing weight despite eating ice cream, down about 15 lbs. Remains dyspneic, really fatigued, has to sit and rest to bake. Found to have a breast papilloma on ultrasound and needs to have it removed - I spoke to Dr Reed. This is next Wednesday, Dr Darwin Garcia at [facility]. Continues to have this rippling, tingling feeling in her scalp that she has reported to me before. 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 The Augmentin seemed to help, less cough in past couple of days, more clear headed. Lost another lb. Generally a bit better. But down 20 lbs since we met and 15 this year.\par \par 1/25/19: Started on 12/21. Feels no better. Some inconsistent GI side effects. Remains very fatigued. Eating. Weight is stable for this month. No rash or vision change. Notes a color change to her toes.\par \par 2/25/19: 2-3 days of feeling like herself, so minimally better than last time. Some blotches on skin, feet still red. And dentist notices more plaque on teeth. Has gained 5 lbs, 102 to 107 lbs.\par \par 4/2/19: Doing ok. Weight stable. A bit more energy. No new side effects. Appetite is ok. Still not particularly active.  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 She is feeling weak. Pain in R shoulder blade. Saw Dr Mcrae, venous insufficiency. Some days feels weak after the meds. Weight is the same. Trying to eat. No nausea, diarrhea, vision change, no rash.

## 2019-05-01 NOTE — REVIEW OF SYSTEMS
[Recent Wt Gain (___ Lbs)] : no recent weight gain [Fatigue] : fatigue [As Noted in HPI] : as noted in HPI [Recent Wt Loss (___ Lbs)] : no recent weight loss [Negative] : Gastrointestinal

## 2019-05-02 LAB
ALBUMIN SERPL ELPH-MCNC: 4.5 G/DL
ALP BLD-CCNC: 40 U/L
ALT SERPL-CCNC: 22 U/L
AST SERPL-CCNC: 27 U/L
BILIRUB DIRECT SERPL-MCNC: 0.2 MG/DL
BILIRUB INDIRECT SERPL-MCNC: 0.3 MG/DL
BILIRUB SERPL-MCNC: 0.6 MG/DL
PROT SERPL-MCNC: 6.7 G/DL

## 2019-05-22 LAB — ACID FAST STN SPT: NORMAL

## 2019-06-03 ENCOUNTER — APPOINTMENT (OUTPATIENT)
Dept: PULMONOLOGY | Facility: CLINIC | Age: 74
End: 2019-06-03
Payer: MEDICARE

## 2019-06-03 VITALS
BODY MASS INDEX: 16.71 KG/M2 | TEMPERATURE: 96.9 F | HEART RATE: 83 BPM | SYSTOLIC BLOOD PRESSURE: 92 MMHG | OXYGEN SATURATION: 94 % | DIASTOLIC BLOOD PRESSURE: 76 MMHG | WEIGHT: 104 LBS | HEIGHT: 66 IN

## 2019-06-03 PROCEDURE — 99213 OFFICE O/P EST LOW 20 MIN: CPT | Mod: 25

## 2019-06-03 PROCEDURE — 94010 BREATHING CAPACITY TEST: CPT

## 2019-06-03 NOTE — PHYSICAL EXAM
[Heart Sounds] : normal S1 and S2 [General Appearance - In No Acute Distress] : no acute distress [Edema] : no peripheral edema present [Murmurs] : no murmurs present [FreeTextEntry1] : few squeaks

## 2019-06-03 NOTE — ASSESSMENT
[FreeTextEntry1] : Data reviewed: \par \par CT chest LHR 4/22/19 personally reviewed again w her:  Interval improvement of right lower lobe and to a lesser extent left lower lobe nodular densities and consolidation likely previously representing superimposed acute bacterial infection. Baseline bibasilar bronchiectasis with chronic wall thickening and scarring and areas of mucous plugging is unchanged in keeping with patient's history of chronic mycobacterial infection.\par \par Spirometry always restricted\par PFT 7/12/18: severe complex restrictive abnormality, no BD response, DLCO 60%\par [place] 1/25/19: restricted, FEV1 33%\par [place] 4/2/19: restricted, FEV1 38%\par [place] 6/3/19: restricted, FEV1 36%\par \par SpCx 8/6/18: bacterial cancelled / rare Penicillium sp & numerous Paecilomyces variotii / rare MAC\par SpCx 8/23/18: AFB neg\par SpCx 8/24/18: mixed GNR incl Pseudomonas putis /  fungus neg / AFB neg\par Bronch 11/27/18: 67% polys, 13% lymphs / >100k Moraxella in BAL / MAC in BAL and BW\par SpCx 1/25/19: AFB neg final\par SpCx 4/2/19: AFB neg final\par \par Impression:\par Bronchiectasis w MAC on tiwk tx since 12/21/18\par \par Plan:\par Continue tiwk MAC tx as follows:\par -azithromycin 500 mg tiwk\par -rifampin 600 mg tiwk\par -ethambutol 25 mg/kg tiwk / 104 lbs = 47kg / 1200 mg\par Will ask Dr Reed to send her recent labs.\par I'm concerned that she is not really clinically better, although her weight has at least stabilized. Her cultures aren't reliable; only grew MAC once pre-treatment. I think we should consider bronchoscoping her again to see if she is still culture positive. She will return in a month and if no further improvement then I will recommend that.

## 2019-06-03 NOTE — CONSULT LETTER
[Courtesy Letter:] : I had the pleasure of seeing your patient, [unfilled], in my office today. [Dear  ___] : Dear  [unfilled], [Consult Closing:] : Thank you very much for allowing me to participate in the care of this patient.  If you have any questions, please do not hesitate to contact me. [Sincerely,] : Sincerely, [Please see my note below.] : Please see my note below. [DrKali  ___] : Dr. DUPREE [FreeTextEntry2] : Rajinder Baez MD\par [address]\[place] [address] [FreeTextEntry3] : Radha Ramos MD

## 2019-06-03 NOTE — REVIEW OF SYSTEMS
[Raynaud] : Raynaud's phenomenon was observed [Negative] : Cardiovascular [As Noted in HPI] : as noted in HPI

## 2019-06-03 NOTE — HISTORY OF PRESENT ILLNESS
[FreeTextEntry1] : Cared for by me since 2015.\par \par 1/6/16: Cefdinir for exacerbation of bronchiectasis.\par 2/24/16: Levaquin 500mg x 7 days for pna dx'd at [facility].\par 7/20/17: Again given Levaquin x 7 days by [facility] for infectious symptoms.\par 6/8/18: Hemoptysis, Levaquin.\par 8/23/18: Seen for dyspnea that did not respond to Breo, weight loss, cough.Grew Pseudomonas and tried treating w Levaquin but there was no improvement and in fact she felt markedly worse on Levaquin.\par \par 11/2/18: Losing weight despite eating ice cream, down about 15 lbs. Remains dyspneic, really fatigued, has to sit and rest to bake. Found to have a breast papilloma on ultrasound and needs to have it removed - I spoke to Dr Reed. This is next Wednesday, Dr Darwin Garcia at [facility]. Continues to have this rippling, tingling feeling in her scalp that she has reported to me before. 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 The Augmentin seemed to help, less cough in past couple of days, more clear headed. Lost another lb. Generally a bit better. But down 20 lbs since we met and 15 this year.\par \par 1/25/19: Started on 12/21. Feels no better. Some inconsistent GI side effects. Remains very fatigued. Eating. Weight is stable for this month. No rash or vision change. Notes a color change to her toes.\par \par 2/25/19: 2-3 days of feeling like herself, so minimally better than last time. Some blotches on skin, feet still red. And dentist notices more plaque on teeth. Has gained 5 lbs, 102 to 107 lbs.\par \par 4/2/19: Doing ok. Weight stable. A bit more energy. No new side effects. Appetite is ok. Still not particularly active.  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 She is feeling weak. Pain in R shoulder blade. Saw Dr Mcrae, venous insufficiency. Some days feels weak after the meds. Weight is the same. Trying to eat. No nausea, diarrhea, vision change, no rash.\par \par 6/3/19: Weight is stable, feels GI upset and diarrhea and fatigue on the days of medications. Dr Huerta adding norvasc for Raynaud's. No rash.

## 2019-07-03 ENCOUNTER — APPOINTMENT (OUTPATIENT)
Dept: PULMONOLOGY | Facility: CLINIC | Age: 74
End: 2019-07-03
Payer: MEDICARE

## 2019-07-03 VITALS
HEART RATE: 81 BPM | WEIGHT: 104 LBS | TEMPERATURE: 98.1 F | DIASTOLIC BLOOD PRESSURE: 74 MMHG | HEIGHT: 66 IN | SYSTOLIC BLOOD PRESSURE: 100 MMHG | OXYGEN SATURATION: 91 % | BODY MASS INDEX: 16.71 KG/M2

## 2019-07-03 PROCEDURE — 99213 OFFICE O/P EST LOW 20 MIN: CPT | Mod: 25

## 2019-07-03 PROCEDURE — 94010 BREATHING CAPACITY TEST: CPT

## 2019-07-03 NOTE — PHYSICAL EXAM
[General Appearance - In No Acute Distress] : no acute distress [Heart Sounds] : normal S1 and S2 [Murmurs] : no murmurs present [Edema] : no peripheral edema present [Auscultation Breath Sounds / Voice Sounds] : lungs were clear to auscultation bilaterally

## 2019-07-03 NOTE — REVIEW OF SYSTEMS
[Fatigue] : fatigue [Poor Appetite] : poor appetite [As Noted in HPI] : as noted in HPI [Constipation] : constipation [Negative] : Cardiovascular

## 2019-07-10 LAB
ANION GAP SERPL CALC-SCNC: 13 MMOL/L
APTT BLD: 31.4 SEC
BASOPHILS # BLD AUTO: 0.04 K/UL
BASOPHILS NFR BLD AUTO: 0.8 %
BUN SERPL-MCNC: 16 MG/DL
CALCIUM SERPL-MCNC: 9.6 MG/DL
CHLORIDE SERPL-SCNC: 103 MMOL/L
CO2 SERPL-SCNC: 22 MMOL/L
CREAT SERPL-MCNC: 0.83 MG/DL
EOSINOPHIL # BLD AUTO: 0.07 K/UL
EOSINOPHIL NFR BLD AUTO: 1.5 %
GLUCOSE SERPL-MCNC: 123 MG/DL
HCT VFR BLD CALC: 43.7 %
HGB BLD-MCNC: 13.6 G/DL
IMM GRANULOCYTES NFR BLD AUTO: 0.2 %
INR PPP: 0.96 RATIO
LYMPHOCYTES # BLD AUTO: 1.48 K/UL
LYMPHOCYTES NFR BLD AUTO: 31.1 %
MAN DIFF?: NORMAL
MCHC RBC-ENTMCNC: 30 PG
MCHC RBC-ENTMCNC: 31.1 GM/DL
MCV RBC AUTO: 96.5 FL
MONOCYTES # BLD AUTO: 0.27 K/UL
MONOCYTES NFR BLD AUTO: 5.7 %
NEUTROPHILS # BLD AUTO: 2.89 K/UL
NEUTROPHILS NFR BLD AUTO: 60.7 %
PLATELET # BLD AUTO: 214 K/UL
POTASSIUM SERPL-SCNC: 4.6 MMOL/L
PT BLD: 11 SEC
RBC # BLD: 4.53 M/UL
RBC # FLD: 14.2 %
SODIUM SERPL-SCNC: 138 MMOL/L
WBC # FLD AUTO: 4.76 K/UL

## 2019-07-16 ENCOUNTER — OUTPATIENT (OUTPATIENT)
Dept: OUTPATIENT SERVICES | Facility: HOSPITAL | Age: 74
LOS: 1 days | Discharge: HOME | End: 2019-07-16
Payer: MEDICARE

## 2019-07-16 ENCOUNTER — APPOINTMENT (OUTPATIENT)
Dept: PULMONOLOGY | Facility: HOSPITAL | Age: 74
End: 2019-07-16

## 2019-07-16 LAB
B PERT IGG+IGM PNL SER: SIGNIFICANT CHANGE UP
B PERT IGG+IGM PNL SER: SIGNIFICANT CHANGE UP
COLOR FLD: SIGNIFICANT CHANGE UP
COLOR FLD: SIGNIFICANT CHANGE UP
EOSINOPHIL # FLD: 1 % — SIGNIFICANT CHANGE UP
FLUID INTAKE SUBSTANCE CLASS: SIGNIFICANT CHANGE UP
FLUID INTAKE SUBSTANCE CLASS: SIGNIFICANT CHANGE UP
FLUID SEGMENTED GRANULOCYTES: 91 % — SIGNIFICANT CHANGE UP
FLUID SEGMENTED GRANULOCYTES: 97 % — SIGNIFICANT CHANGE UP
GRAM STN FLD: SIGNIFICANT CHANGE UP
LYMPHOCYTES # FLD: 1 % — SIGNIFICANT CHANGE UP
LYMPHOCYTES # FLD: 3 % — SIGNIFICANT CHANGE UP
MONOS+MACROS # FLD: 2 % — SIGNIFICANT CHANGE UP
MONOS+MACROS # FLD: 5 % — SIGNIFICANT CHANGE UP
RCV VOL RI: 4100 /UL — HIGH (ref 0–5)
RCV VOL RI: 4900 /UL — HIGH (ref 0–5)
SPECIMEN SOURCE FLD: SIGNIFICANT CHANGE UP
SPECIMEN SOURCE FLD: SIGNIFICANT CHANGE UP
SPECIMEN SOURCE: SIGNIFICANT CHANGE UP
TOTAL NUCLEATED CELL COUNT, BODY FLUID: 1368 /UL — HIGH (ref 0–5)
TOTAL NUCLEATED CELL COUNT, BODY FLUID: 1780 /UL — HIGH (ref 0–5)
TUBE TYPE: SIGNIFICANT CHANGE UP
TUBE TYPE: SIGNIFICANT CHANGE UP

## 2019-07-16 PROCEDURE — 87305 ASPERGILLUS AG IA: CPT

## 2019-07-16 PROCEDURE — 87449 NOS EACH ORGANISM AG IA: CPT

## 2019-07-16 PROCEDURE — 87252 VIRUS INOCULATION TISSUE: CPT

## 2019-07-16 PROCEDURE — 87116 MYCOBACTERIA CULTURE: CPT

## 2019-07-16 PROCEDURE — 87070 CULTURE OTHR SPECIMN AEROBIC: CPT

## 2019-07-16 PROCEDURE — 87186 SC STD MICRODIL/AGAR DIL: CPT

## 2019-07-16 PROCEDURE — 87102 FUNGUS ISOLATION CULTURE: CPT

## 2019-07-16 PROCEDURE — 31624 DX BRONCHOSCOPE/LAVAGE: CPT

## 2019-07-16 PROCEDURE — 87206 SMEAR FLUORESCENT/ACID STAI: CPT

## 2019-07-16 PROCEDURE — 87015 SPECIMEN INFECT AGNT CONCNTJ: CPT

## 2019-07-16 PROCEDURE — 89051 BODY FLUID CELL COUNT: CPT

## 2019-07-17 LAB
GALACTOMANNAN AG SPEC IA-ACNC: <0.5 INDEX — SIGNIFICANT CHANGE UP
GALACTOMANNAN AG SPEC IA-ACNC: <0.5 INDEX — SIGNIFICANT CHANGE UP
GRAM STN FLD: SIGNIFICANT CHANGE UP
NIGHT BLUE STAIN TISS: SIGNIFICANT CHANGE UP
NIGHT BLUE STAIN TISS: SIGNIFICANT CHANGE UP
SPECIMEN SOURCE: SIGNIFICANT CHANGE UP

## 2019-07-18 LAB
-  AZTREONAM: SIGNIFICANT CHANGE UP
-  AZTREONAM: SIGNIFICANT CHANGE UP
-  CEFEPIME: SIGNIFICANT CHANGE UP
-  CEFEPIME: SIGNIFICANT CHANGE UP
-  GENTAMICIN: SIGNIFICANT CHANGE UP
-  GENTAMICIN: SIGNIFICANT CHANGE UP
-  PIPERACILLIN/TAZOBACTAM: SIGNIFICANT CHANGE UP
-  PIPERACILLIN/TAZOBACTAM: SIGNIFICANT CHANGE UP
-  TOBRAMYCIN: SIGNIFICANT CHANGE UP
-  TOBRAMYCIN: SIGNIFICANT CHANGE UP
CULTURE RESULTS: SIGNIFICANT CHANGE UP
CULTURE RESULTS: SIGNIFICANT CHANGE UP
FUNGITELL B-D-GLUCAN,  BRONCHIAL LAVAGE: SIGNIFICANT CHANGE UP
FUNGITELL B-D-GLUCAN,  BRONCHIAL WASH: SIGNIFICANT CHANGE UP
METHOD TYPE: SIGNIFICANT CHANGE UP
METHOD TYPE: SIGNIFICANT CHANGE UP
ORGANISM # SPEC MICROSCOPIC CNT: SIGNIFICANT CHANGE UP
SPECIMEN SOURCE: SIGNIFICANT CHANGE UP
SPECIMEN SOURCE: SIGNIFICANT CHANGE UP

## 2019-07-22 RX ORDER — CIPROFLOXACIN HYDROCHLORIDE 500 MG/1
500 TABLET, FILM COATED ORAL
Qty: 28 | Refills: 0 | Status: COMPLETED | COMMUNITY
Start: 2019-07-22 | End: 2019-08-05

## 2019-07-22 NOTE — HISTORY OF PRESENT ILLNESS
[FreeTextEntry1] : Cared for by me since 2015.\par \par 1/6/16: Cefdinir for exacerbation of bronchiectasis.\par 2/24/16: Levaquin 500mg x 7 days for pna dx'd at [facility].\par 7/20/17: Again given Levaquin x 7 days by [facility] for infectious symptoms.\par 6/8/18: Hemoptysis, Levaquin.\par 8/23/18: Seen for dyspnea that did not respond to Breo, weight loss, cough.Grew Pseudomonas and tried treating w Levaquin but there was no improvement and in fact she felt markedly worse on Levaquin.\par \par 11/2/18: Losing weight despite eating ice cream, down about 15 lbs. Remains dyspneic, really fatigued, has to sit and rest to bake. Found to have a breast papilloma on ultrasound and needs to have it removed - I spoke to Dr Reed. This is next Wednesday, Dr Darwin Garcia at [facility]. Continues to have this rippling, tingling feeling in her scalp that she has reported to me before. 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 The Augmentin seemed to help, less cough in past couple of days, more clear headed. Lost another lb. Generally a bit better. But down 20 lbs since we met and 15 this year.\par \par 1/25/19: Started on 12/21. Feels no better. Some inconsistent GI side effects. Remains very fatigued. Eating. Weight is stable for this month. No rash or vision change. Notes a color change to her toes.\par \par 2/25/19: 2-3 days of feeling like herself, so minimally better than last time. Some blotches on skin, feet still red. And dentist notices more plaque on teeth. Has gained 5 lbs, 102 to 107 lbs.\par \par 4/2/19: Doing ok. Weight stable. A bit more energy. No new side effects. Appetite is ok. Still not particularly active.  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 She is feeling weak. Pain in R shoulder blade. Saw Dr Mcrae, venous insufficiency. Some days feels weak after the meds. Weight is the same. Trying to eat. No nausea, diarrhea, vision change, no rash.\par \par 6/3/19: Weight is stable, feels GI upset and diarrhea and fatigue on the days of medications. Dr Huerta adding norvasc for Raynaud's. No rash.\par \par 7/2/19: Has terrible fatigue on the pill days. Felt good last week for 4 days which was good, but then took pills and bad again. Diarrhea, now mostly constipation. Forcing food despite no appetite. Sometimes dyspneic. Cardiac testing was all fine.

## 2019-07-22 NOTE — ASSESSMENT
[FreeTextEntry1] : Data reviewed: \par \par CT chest LHR 4/22/19 personally reviewed again w her: Interval improvement of right lower lobe and to a lesser extent left lower lobe nodular densities and consolidation likely previously representing superimposed acute bacterial infection. Baseline bibasilar bronchiectasis with chronic wall thickening and scarring and areas of mucous plugging is unchanged in keeping with patient's history of chronic mycobacterial infection.\par \par Spirometry always restricted\par PFT 7/12/18: severe complex restrictive abnormality, no BD response, DLCO 60%\par [place] 1/25/19: restricted, FEV1 33%\par [place] 4/2/19: restricted, FEV1 38%\par [place] 6/3/19: restricted, FEV1 36%\par [place] 7/3/19: restricted, FEV1 38%\par \par SpCx 8/6/18: bacterial cancelled / rare Penicillium sp & numerous Paecilomyces variotii / rare MAC\par SpCx 8/23/18: AFB neg\par SpCx 8/24/18: mixed GNR incl Pseudomonas putis / fungus neg / AFB neg\par Bronch 11/27/18: 67% polys, 13% lymphs / >100k Moraxella in BAL / MAC in BAL and BW\par SpCx 1/25/19: AFB neg final\par SpCx 4/2/19: AFB neg final\par \par Impression:\par Bronchiectasis w MAC on tiwk tx since 12/21/18\par \par Plan:\par Continue tiwk MAC tx as follows:\par -azithromycin 500 mg tiwk\par -rifampin 600 mg tiwk\par -ethambutol 25 mg/kg tiwk / 104 lbs = 47kg / 1200 mg\par Will ask Dr Reed to send her recent labs.\par Again, not much better. Exam is better, and weight loss has stopped, but remains terribly fatigued. Will continue on treatment but again in a month consider bronchoscopy to evaluate for treatment efficacy by culture.\par --\par Bronch 7/2019: polys, P aeruginosa, AFB smear neg and Cx pending\par Cipro 500mg q12h x 14 days; has follow up on 8/6. Failing that SYDNEY x 30 days.

## 2019-08-05 LAB — RESPIRATORY PATH PNL SPEC CULT: SIGNIFICANT CHANGE UP

## 2019-08-06 ENCOUNTER — APPOINTMENT (OUTPATIENT)
Dept: PULMONOLOGY | Facility: CLINIC | Age: 74
End: 2019-08-06
Payer: MEDICARE

## 2019-08-06 VITALS
HEIGHT: 66 IN | HEART RATE: 83 BPM | WEIGHT: 104 LBS | BODY MASS INDEX: 16.71 KG/M2 | TEMPERATURE: 98.3 F | DIASTOLIC BLOOD PRESSURE: 80 MMHG | SYSTOLIC BLOOD PRESSURE: 106 MMHG | OXYGEN SATURATION: 93 %

## 2019-08-06 PROCEDURE — 36415 COLL VENOUS BLD VENIPUNCTURE: CPT

## 2019-08-06 PROCEDURE — 99213 OFFICE O/P EST LOW 20 MIN: CPT | Mod: 25

## 2019-08-06 PROCEDURE — 94010 BREATHING CAPACITY TEST: CPT

## 2019-08-06 NOTE — PHYSICAL EXAM
[General Appearance - In No Acute Distress] : no acute distress [Murmurs] : no murmurs present [Heart Sounds] : normal S1 and S2 [Edema] : no peripheral edema present [Auscultation Breath Sounds / Voice Sounds] : lungs were clear to auscultation bilaterally

## 2019-08-07 LAB
ALBUMIN SERPL ELPH-MCNC: 4.5 G/DL
ALP BLD-CCNC: 32 U/L
ALT SERPL-CCNC: 20 U/L
AST SERPL-CCNC: 27 U/L
BILIRUB DIRECT SERPL-MCNC: 0.1 MG/DL
BILIRUB INDIRECT SERPL-MCNC: 0.2 MG/DL
BILIRUB SERPL-MCNC: 0.3 MG/DL
PROT SERPL-MCNC: 6.9 G/DL
RESPIRATORY PATH PNL SPEC CULT: SIGNIFICANT CHANGE UP

## 2019-08-26 NOTE — CONSULT LETTER
[Dear  ___] : Dear  [unfilled], [Courtesy Letter:] : I had the pleasure of seeing your patient, [unfilled], in my office today. [Please see my note below.] : Please see my note below. [Consult Closing:] : Thank you very much for allowing me to participate in the care of this patient.  If you have any questions, please do not hesitate to contact me. [Sincerely,] : Sincerely, [DrKali  ___] : Dr. DUPREE [FreeTextEntry2] : Rajinder Baez MD\par [address]\[place] [address] [FreeTextEntry3] : Radha Ramos MD

## 2019-08-26 NOTE — ASSESSMENT
[FreeTextEntry1] : Data reviewed: \par \par CT chest LHR 4/22/19: Interval improvement of right lower lobe and to a lesser extent left lower lobe nodular densities and consolidation likely previously representing superimposed acute bacterial infection. Baseline bibasilar bronchiectasis with chronic wall thickening and scarring and areas of mucous plugging is unchanged in keeping with patient's history of chronic mycobacterial infection.\par \par Spirometry always restricted\par PFT 7/12/18: severe complex restrictive abnormality, no BD response, DLCO 60%\par [place] 1/25/19: restricted, FEV1 33%\par [place] 4/2/19: restricted, FEV1 38%\par [place] 6/3/19: restricted, FEV1 36%\par [place] 7/3/19: restricted, FEV1 38%\par [place] 8/6/19: restricted, FVC 33%\par \par SpCx 8/6/18: bacterial cancelled / rare Penicillium sp & numerous Paecilomyces variotii / rare MAC\par SpCx 8/23/18: AFB neg\par SpCx 8/24/18: mixed GNR incl Pseudomonas putis / fungus neg / AFB neg\par Bronch 11/27/18: 67% polys, 13% lymphs / >100k Moraxella in BAL / MAC in BAL and BW\par SpCx 1/25/19: AFB neg final\par SpCx 4/2/19: AFB neg final\par Bronch 7/16/2019: polys, P aeruginosa, AFB smear neg and Cx neg to date\par \par Impression:\par Bronchiectasis w MAC on tiwk tx since 12/21/18, w Pseudomonas on bronch 7/2019\par \par Plan:\par Continue tiwk MAC tx as follows:\par -azithromycin 500 mg tiwk\par -rifampin 600 mg tiwk\par -ethambutol 25 mg/kg tiwk / 104 lbs = 47kg / 1200 mg\par Check LFTs.\par Treatment of Pseudomonas w oral cipro didn't make her any better. The bulk of her symptoms seem to be coming from MAC treatment. Defer further treatment of Pseudomonas at this time.\par Can have endoscopy. The anesthesiologist should note that her baseline room air sat is in the low 90s.\par Will send note to Dr Pichardo.\par --\par 8/26/19: Notified that she is growing AFB from bronch 7/16.\par

## 2019-08-26 NOTE — HISTORY OF PRESENT ILLNESS
[FreeTextEntry1] : Cared for by me since 2015.\par \par 1/6/16: Cefdinir for exacerbation of bronchiectasis.\par 2/24/16: Levaquin 500mg x 7 days for pna dx'd at [facility].\par 7/20/17: Again given Levaquin x 7 days by [facility] for infectious symptoms.\par 6/8/18: Hemoptysis, Levaquin.\par 8/23/18: Seen for dyspnea that did not respond to Breo, weight loss, cough.Grew Pseudomonas and tried treating w Levaquin but there was no improvement and in fact she felt markedly worse on Levaquin.\par \par 11/2/18: Losing weight despite eating ice cream, down about 15 lbs. Remains dyspneic, really fatigued, has to sit and rest to bake. Found to have a breast papilloma on ultrasound and needs to have it removed - I spoke to Dr Reed. This is next Wednesday, Dr Darwin Garcia at [facility]. Continues to have this rippling, tingling feeling in her scalp that she has reported to me before. 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 The Augmentin seemed to help, less cough in past couple of days, more clear headed. Lost another lb. Generally a bit better. But down 20 lbs since we met and 15 this year.\par \par 1/25/19: Started on 12/21. Feels no better. Some inconsistent GI side effects. Remains very fatigued. Eating. Weight is stable for this month. No rash or vision change. Notes a color change to her toes.\par \par 2/25/19: 2-3 days of feeling like herself, so minimally better than last time. Some blotches on skin, feet still red. And dentist notices more plaque on teeth. Has gained 5 lbs, 102 to 107 lbs.\par \par 4/2/19: Doing ok. Weight stable. A bit more energy. No new side effects. Appetite is ok. Still not particularly active.  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 She is feeling weak. Pain in R shoulder blade. Saw Dr Mcrae, venous insufficiency. Some days feels weak after the meds. Weight is the same. Trying to eat. No nausea, diarrhea, vision change, no rash.\par \par 6/3/19: Weight is stable, feels GI upset and diarrhea and fatigue on the days of medications. Dr Huerta adding norvasc for Raynaud's. No rash.\par \par 7/2/19: Has terrible fatigue on the pill days. Felt good last week for 4 days which was good, but then took pills and bad again. Diarrhea, now mostly constipation. Forcing food despite no appetite. Sometimes dyspneic. Cardiac testing was all fine.\par \par 8/6/19: Had bronchoscopy 7/16, grew Pseudomonas. Yesterday chills, stomach clenching, no appetite. Today feels dyspneic. Finished 2 weeks cipro for the Pseudomonas and no real improvement in anything. Needs endoscopy, [facility]. Seeing Dr Pichardo next week.

## 2019-08-26 NOTE — REVIEW OF SYSTEMS
[Fatigue] : fatigue [Reflux] : reflux [As Noted in HPI] : as noted in HPI [Recent Wt Loss (___ Lbs)] : no recent weight loss

## 2019-09-10 ENCOUNTER — APPOINTMENT (OUTPATIENT)
Dept: PULMONOLOGY | Facility: CLINIC | Age: 74
End: 2019-09-10
Payer: MEDICARE

## 2019-09-10 VITALS
OXYGEN SATURATION: 93 % | BODY MASS INDEX: 16.88 KG/M2 | WEIGHT: 105 LBS | SYSTOLIC BLOOD PRESSURE: 98 MMHG | HEART RATE: 82 BPM | HEIGHT: 66 IN | TEMPERATURE: 97.3 F | DIASTOLIC BLOOD PRESSURE: 82 MMHG

## 2019-09-10 PROCEDURE — 99213 OFFICE O/P EST LOW 20 MIN: CPT | Mod: 25

## 2019-09-10 PROCEDURE — 36415 COLL VENOUS BLD VENIPUNCTURE: CPT

## 2019-09-10 PROCEDURE — 94010 BREATHING CAPACITY TEST: CPT

## 2019-09-10 NOTE — PHYSICAL EXAM
[General Appearance - In No Acute Distress] : no acute distress [Heart Sounds] : normal S1 and S2 [Murmurs] : no murmurs present [Edema] : no peripheral edema present [FreeTextEntry1] : squeaks

## 2019-09-10 NOTE — REVIEW OF SYSTEMS
[Recent Wt Loss (___ Lbs)] : no recent weight loss [As Noted in HPI] : as noted in HPI [Negative] : Cardiovascular

## 2019-09-10 NOTE — HISTORY OF PRESENT ILLNESS
[FreeTextEntry1] : Cared for by me since 2015.\par \par 1/6/16: Cefdinir for exacerbation of bronchiectasis.\par 2/24/16: Levaquin 500mg x 7 days for pna dx'd at [facility].\par 7/20/17: Again given Levaquin x 7 days by [facility] for infectious symptoms.\par 6/8/18: Hemoptysis, Levaquin.\par 8/23/18: Seen for dyspnea that did not respond to Breo, weight loss, cough.Grew Pseudomonas and tried treating w Levaquin but there was no improvement and in fact she felt markedly worse on Levaquin.\par \par 11/2/18: Losing weight despite eating ice cream, down about 15 lbs. Remains dyspneic, really fatigued, has to sit and rest to bake. Found to have a breast papilloma on ultrasound and needs to have it removed - I spoke to Dr Reed. This is next Wednesday, Dr Darwin Garcia at [facility]. Continues to have this rippling, tingling feeling in her scalp that she has reported to me before. 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 The Augmentin seemed to help, less cough in past couple of days, more clear headed. Lost another lb. Generally a bit better. But down 20 lbs since we met and 15 this year.\par \par 1/25/19: Started on 12/21. Feels no better. Some inconsistent GI side effects. Remains very fatigued. Eating. Weight is stable for this month. No rash or vision change. Notes a color change to her toes.\par \par 2/25/19: 2-3 days of feeling like herself, so minimally better than last time. Some blotches on skin, feet still red. And dentist notices more plaque on teeth. Has gained 5 lbs, 102 to 107 lbs.\par \par 4/2/19: Doing ok. Weight stable. A bit more energy. No new side effects. Appetite is ok. Still not particularly active.  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 She is feeling weak. Pain in R shoulder blade. Saw Dr Mcrae, venous insufficiency. Some days feels weak after the meds. Weight is the same. Trying to eat. No nausea, diarrhea, vision change, no rash.\par \par 6/3/19: Weight is stable, feels GI upset and diarrhea and fatigue on the days of medications. Dr Huerta adding norvasc for Raynaud's. No rash.\par \par 7/2/19: Has terrible fatigue on the pill days. Felt good last week for 4 days which was good, but then took pills and bad again. Diarrhea, now mostly constipation. Forcing food despite no appetite. Sometimes dyspneic. Cardiac testing was all fine.\par \par 8/6/19: Had bronchoscopy 7/16, grew Pseudomonas. Yesterday chills, stomach clenching, no appetite. Today feels dyspneic. Finished 2 weeks cipro for the Pseudomonas and no real improvement in anything. Needs endoscopy, [facility]. Seeing Dr Pichardo next week.\par \par 9/10/19: Some days 105 lbs, an improvement. Can walk 30 min in park - also an improvement. Saw Dr Marino Starkey. L shoulder pain. Continues to feel fatigued on med days.

## 2019-09-10 NOTE — ASSESSMENT
[FreeTextEntry1] : Data reviewed: \par \par CT chest LHR 4/22/19: Interval improvement of right lower lobe and to a lesser extent left lower lobe nodular densities and consolidation likely previously representing superimposed acute bacterial infection. Baseline bibasilar bronchiectasis with chronic wall thickening and scarring and areas of mucous plugging is unchanged in keeping with patient's history of chronic mycobacterial infection.\par \par Spirometry always restricted\par PFT 7/12/18: severe complex restrictive abnormality, no BD response, DLCO 60%\par [place] 1/25/19: restricted, FEV1 33%\par [place] 4/2/19: restricted, FEV1 38%\par [place] 6/3/19: restricted, FEV1 36%\par [place] 7/3/19: restricted, FEV1 38%\par [place] 8/6/19: restricted, FVC 33%\par [place] 9/10/19: restricted, FEV1 37%\par \par SpCx 8/6/18: bacterial cancelled / rare Penicillium sp & numerous Paecilomyces variotii / rare MAC\par SpCx 8/23/18: AFB neg\par SpCx 8/24/18: mixed GNR incl Pseudomonas putis / fungus neg / AFB neg\par Bronch 11/27/18: 67% polys, 13% lymphs / >100k Moraxella in BAL / MAC in BAL and BW\par SpCx 1/25/19: AFB neg final\par SpCx 4/2/19: AFB neg final\par Bronch 7/16/2019: polys, P aeruginosa, rare MAC in BW 8/26, neg in BAL\par \par Impression:\par Bronchiectasis w MAC on tiwk tx since 12/21/18, w Pseudomonas and rare MAC on bronch 7/2019\par \par Plan:\par Continue tiwk MAC tx as follows:\par -azithromycin 500 mg tiwk\par -rifampin 450mg tiwk (changed from 600 mg tiwk on 9/10/19)\par -ethambutol 25 mg/kg tiwk / 104 lbs = 47kg / 1200 mg\par Check LFTs.\par Deferring treatment of Pseudomonas at this time.\par [place] instructions given.\par Return 1 month.

## 2019-09-11 LAB
ALBUMIN SERPL ELPH-MCNC: 4.4 G/DL
ALP BLD-CCNC: 32 U/L
ALT SERPL-CCNC: 17 U/L
AST SERPL-CCNC: 27 U/L
BILIRUB DIRECT SERPL-MCNC: 0.1 MG/DL
BILIRUB INDIRECT SERPL-MCNC: 0.2 MG/DL
BILIRUB SERPL-MCNC: 0.3 MG/DL
PROT SERPL-MCNC: 6.7 G/DL

## 2019-10-11 ENCOUNTER — APPOINTMENT (OUTPATIENT)
Dept: PULMONOLOGY | Facility: CLINIC | Age: 74
End: 2019-10-11
Payer: MEDICARE

## 2019-10-11 VITALS
HEIGHT: 66 IN | WEIGHT: 105 LBS | OXYGEN SATURATION: 94 % | DIASTOLIC BLOOD PRESSURE: 70 MMHG | BODY MASS INDEX: 16.88 KG/M2 | HEART RATE: 76 BPM | TEMPERATURE: 97 F | SYSTOLIC BLOOD PRESSURE: 108 MMHG

## 2019-10-11 PROCEDURE — 99213 OFFICE O/P EST LOW 20 MIN: CPT | Mod: 25

## 2019-10-11 PROCEDURE — 36415 COLL VENOUS BLD VENIPUNCTURE: CPT

## 2019-10-11 PROCEDURE — 94010 BREATHING CAPACITY TEST: CPT

## 2019-10-14 LAB
ALBUMIN SERPL ELPH-MCNC: 4.4 G/DL
ALP BLD-CCNC: 36 U/L
ALT SERPL-CCNC: 20 U/L
AST SERPL-CCNC: 27 U/L
BILIRUB DIRECT SERPL-MCNC: 0.1 MG/DL
BILIRUB INDIRECT SERPL-MCNC: 0.1 MG/DL
BILIRUB SERPL-MCNC: 0.2 MG/DL
PROT SERPL-MCNC: 6.5 G/DL

## 2019-11-08 NOTE — ASSESSMENT
[FreeTextEntry1] : Data reviewed: \par \par CT chest L[facility] 4/22/19 personally reviewed w her:  Interval improvement of right lower lobe and to a lesser extent left lower lobe nodular densities and consolidation likely previously representing superimposed acute bacterial infection. Baseline bibasilar bronchiectasis with chronic wall thickening and scarring and areas of mucous plugging is unchanged in keeping with patient's history of chronic mycobacterial infection.\par \par Spirometry always restricted\par PFT 7/12/18: severe complex restrictive abnormality, no BD response, DLCO 60%\par FEV1 is always in 30s\par [place] 10/11/19: restricted, FEV1 37%\par \par SpCx 8/6/18: bacterial cancelled / rare Penicillium sp & numerous Paecilomyces variotii / rare MAC\par SpCx 8/23/18: AFB neg\par SpCx 8/24/18: mixed GNR incl Pseudomonas putis / fungus neg / AFB neg\par Bronch 11/27/18: 67% polys, 13% lymphs / >100k Moraxella in BAL / MAC in BAL and BW\par SpCx 1/25/19: AFB neg final\par SpCx 4/2/19: AFB neg final\par Bronch 7/16/2019: polys, P aeruginosa, rare MAC in BW 8/26, neg in BAL\par \par Impression:\par Bronchiectasis w MAC on tiwk tx since 12/21/18, w Pseudomonas and rare MAC on bronch 7/2019\par \par Plan:\par Continue tiwk MAC tx as follows:\par -azithromycin 500 mg tiwk\par -rifampin 450mg tiwk (changed from 600 mg tiwk on 9/10/19)\par -ethambutol 25 mg/kg tiwk / 104 lbs = 47kg / 1200 mg\par Talked about spacing of meds.\par Check LFTs.\par Deferring treatment of Pseudomonas at this time.\par Return 1 month.\par Had flu vax.\par --\par 1106/19 labs: TAs 51 from 20s - address next visit

## 2019-11-08 NOTE — HISTORY OF PRESENT ILLNESS
[FreeTextEntry1] : Cared for by me since 2015.\par \par 1/6/16: Cefdinir for exacerbation of bronchiectasis.\par 2/24/16: Levaquin 500mg x 7 days for pna dx'd at [facility].\par 7/20/17: Again given Levaquin x 7 days by [facility] for infectious symptoms.\par 6/8/18: Hemoptysis, Levaquin.\par 8/23/18: Seen for dyspnea that did not respond to Breo, weight loss, cough.Grew Pseudomonas and tried treating w Levaquin but there was no improvement and in fact she felt markedly worse on Levaquin.\par \par 11/2/18: Losing weight despite eating ice cream, down about 15 lbs. Remains dyspneic, really fatigued, has to sit and rest to bake. Found to have a breast papilloma on ultrasound and needs to have it removed - I spoke to Dr Reed. This is next Wednesday, Dr Darwin Garcia at [facility]. Continues to have this rippling, tingling feeling in her scalp that she has reported to me before. 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 The Augmentin seemed to help, less cough in past couple of days, more clear headed. Lost another lb. Generally a bit better. But down 20 lbs since we met and 15 this year.\par \par 1/25/19: Started on 12/21. Feels no better. Some inconsistent GI side effects. Remains very fatigued. Eating. Weight is stable for this month. No rash or vision change. Notes a color change to her toes.\par \par 2/25/19: 2-3 days of feeling like herself, so minimally better than last time. Some blotches on skin, feet still red. And dentist notices more plaque on teeth. Has gained 5 lbs, 102 to 107 lbs.\par \par 4/2/19: Doing ok. Weight stable. A bit more energy. No new side effects. Appetite is ok. Still not particularly active.  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 She is feeling weak. Pain in R shoulder blade. Saw Dr Mcrae, venous insufficiency. Some days feels weak after the meds. Weight is the same. Trying to eat. No nausea, diarrhea, vision change, no rash.\par \par 6/3/19: Weight is stable, feels GI upset and diarrhea and fatigue on the days of medications. Dr Huerta adding norvasc for Raynaud's. No rash.\par \par 7/2/19: Has terrible fatigue on the pill days. Felt good last week for 4 days which was good, but then took pills and bad again. Diarrhea, now mostly constipation. Forcing food despite no appetite. Sometimes dyspneic. Cardiac testing was all fine.\par \par 8/6/19: Had bronchoscopy 7/16, grew Pseudomonas. Yesterday chills, stomach clenching, no appetite. Today feels dyspneic. Finished 2 weeks cipro for the Pseudomonas and no real improvement in anything. Needs endoscopy, [facility]. Seeing Dr Pichardo next week.\par \par 9/10/19: Some days 105 lbs, an improvement. Can walk 30 min in park - also an improvement. Saw Dr Pichardo - [place]. L shoulder pain. Continues to feel fatigued on med days.\par \par 10/11/19: No significant change this month. Weight stable. Was walking 30 min in park and tripped and broke foot - in a hard soled shoe now. Summoned for jury duty - unable.

## 2019-11-11 ENCOUNTER — APPOINTMENT (OUTPATIENT)
Dept: PULMONOLOGY | Facility: CLINIC | Age: 74
End: 2019-11-11
Payer: MEDICARE

## 2019-11-11 VITALS
BODY MASS INDEX: 17.36 KG/M2 | DIASTOLIC BLOOD PRESSURE: 74 MMHG | WEIGHT: 108 LBS | SYSTOLIC BLOOD PRESSURE: 100 MMHG | OXYGEN SATURATION: 94 % | HEIGHT: 66 IN | TEMPERATURE: 96.6 F | HEART RATE: 85 BPM

## 2019-11-11 PROCEDURE — 94010 BREATHING CAPACITY TEST: CPT

## 2019-11-11 PROCEDURE — 99213 OFFICE O/P EST LOW 20 MIN: CPT | Mod: 25

## 2019-11-11 PROCEDURE — 36415 COLL VENOUS BLD VENIPUNCTURE: CPT

## 2019-11-11 NOTE — REVIEW OF SYSTEMS
[Recent Wt Gain (___ Lbs)] : recent [unfilled] ~Ulb weight gain [As Noted in HPI] : as noted in HPI [FreeTextEntry7] : crampiness on med days, no new GI symptoms

## 2019-11-11 NOTE — PHYSICAL EXAM
[General Appearance - In No Acute Distress] : no acute distress [Edema] : no peripheral edema present [Heart Sounds] : normal S1 and S2 [Murmurs] : no murmurs present [Abdomen Tenderness] : non-tender [Auscultation Breath Sounds / Voice Sounds] : lungs were clear to auscultation bilaterally [FreeTextEntry1] : no squeaks today

## 2019-11-11 NOTE — ASSESSMENT
[FreeTextEntry1] : Data reviewed: \par \par CT chest LHR 4/22/19 personally reviewed w her:  Interval improvement of right lower lobe and to a lesser extent left lower lobe nodular densities and consolidation likely previously representing superimposed acute bacterial infection. Baseline bibasilar bronchiectasis with chronic wall thickening and scarring and areas of mucous plugging is unchanged in keeping with patient's history of chronic mycobacterial infection.\par \par Spirometry always restricted\par PFT 7/12/18: severe complex restrictive abnormality, no BD response, DLCO 60%\par FEV1 is always in 30s\par [place] 10/11/19: restricted, FEV1 37%\par [place] 11/11/19: restricted, FEV1 36%\par \par SpCx 8/6/18: bacterial cancelled / rare Penicillium sp & numerous Paecilomyces variotii / rare MAC\par SpCx 8/23/18: AFB neg\par SpCx 8/24/18: mixed GNR incl Pseudomonas putis / fungus neg / AFB neg\par Bronch 11/27/18: 67% polys, 13% lymphs / >100k Moraxella in BAL / MAC in BAL and BW\par SpCx 1/25/19: AFB neg final\par SpCx 4/2/19: AFB neg final\par Bronch 7/16/2019: polys, P aeruginosa, rare MAC in BW 8/26, neg in BAL\par \par Impression:\par Bronchiectasis w MAC on tiwk tx since 12/21/18, w Pseudomonas and rare MAC on bronch 7/2019\par Elevated TAs\par \par Plan:\par Check LFTs and hepatitis panel today.\par If ok, continue tiwk MAC tx as follows:\par -azithromycin 500 mg tiwk\par -rifampin 450mg tiwk (changed from 600 mg tiwk on 9/10/19)\par -ethambutol 25 mg/kg tiwk / 104 lbs = 47kg / 1200 mg\par Return 1 month.

## 2019-11-11 NOTE — HISTORY OF PRESENT ILLNESS
[FreeTextEntry1] : Cared for by me since 2015.\par \par 1/6/16: Cefdinir for exacerbation of bronchiectasis.\par 2/24/16: Levaquin 500mg x 7 days for pna dx'd at [facility].\par 7/20/17: Again given Levaquin x 7 days by [facility] for infectious symptoms.\par 6/8/18: Hemoptysis, Levaquin.\par 8/23/18: Seen for dyspnea that did not respond to Breo, weight loss, cough.Grew Pseudomonas and tried treating w Levaquin but there was no improvement and in fact she felt markedly worse on Levaquin.\par 12/2018: Bronchoscoped, MAC, started on 3 drugs tiwk 12/21/18.\par 2019 monthly routine visits,  has cardiac amyloid. GI symptoms day of meds, fatigue.\par 7/2019: Bronchoscoped, Pseudomonas, cipro improved nothing.\par \par 9/10/19: Some days 105 lbs, an improvement. Can walk 30 min in park - also an improvement. Saw Dr Marino Starkey. L shoulder pain. Continues to feel fatigued on med days.\par \par 10/11/19: No significant change this month. Weight stable. Was walking 30 min in park and tripped and broke foot - in a hard soled shoe now. Summoned for jury duty - unable.\par \par 11/11/19: Gained a pound. Had 2 days of chills but that went away. Had routine labs w Dr Baez and TAs a little up.

## 2019-11-12 LAB
ALBUMIN SERPL ELPH-MCNC: 4.1 G/DL
ALP BLD-CCNC: 34 U/L
ALT SERPL-CCNC: 36 U/L
AST SERPL-CCNC: 34 U/L
BILIRUB DIRECT SERPL-MCNC: 0.1 MG/DL
BILIRUB INDIRECT SERPL-MCNC: 0.2 MG/DL
BILIRUB SERPL-MCNC: 0.3 MG/DL
PROT SERPL-MCNC: 6.5 G/DL

## 2019-11-13 LAB
HAV IGM SER QL: NONREACTIVE
HBV CORE IGM SER QL: NONREACTIVE
HBV SURFACE AG SER QL: NONREACTIVE
HCV AB SER QL: NONREACTIVE
HCV S/CO RATIO: 0.08 S/CO

## 2019-12-10 ENCOUNTER — APPOINTMENT (OUTPATIENT)
Dept: PULMONOLOGY | Facility: CLINIC | Age: 74
End: 2019-12-10
Payer: MEDICARE

## 2019-12-10 VITALS
WEIGHT: 108 LBS | BODY MASS INDEX: 17.36 KG/M2 | SYSTOLIC BLOOD PRESSURE: 110 MMHG | OXYGEN SATURATION: 94 % | DIASTOLIC BLOOD PRESSURE: 66 MMHG | HEART RATE: 84 BPM | TEMPERATURE: 97.8 F | HEIGHT: 66 IN

## 2019-12-10 PROCEDURE — 36415 COLL VENOUS BLD VENIPUNCTURE: CPT

## 2019-12-10 PROCEDURE — 99213 OFFICE O/P EST LOW 20 MIN: CPT | Mod: 25

## 2019-12-10 PROCEDURE — 94010 BREATHING CAPACITY TEST: CPT

## 2019-12-10 NOTE — PHYSICAL EXAM
[General Appearance - In No Acute Distress] : no acute distress [Heart Sounds] : normal S1 and S2 [Edema] : no peripheral edema present [Murmurs] : no murmurs present [Abdomen Tenderness] : non-tender [Auscultation Breath Sounds / Voice Sounds] : lungs were clear to auscultation bilaterally [FreeTextEntry1] : some dry patches on arms

## 2019-12-10 NOTE — ASSESSMENT
[FreeTextEntry1] : Data reviewed: \par \par CT chest LHR 4/22/19:  Interval improvement of right lower lobe and to a lesser extent left lower lobe nodular densities and consolidation likely previously representing superimposed acute bacterial infection. Baseline bibasilar bronchiectasis with chronic wall thickening and scarring and areas of mucous plugging is unchanged in keeping with patient's history of chronic mycobacterial infection.\par \par Spirometry always restricted\par PFT 7/12/18: severe complex restrictive abnormality, no BD response, DLCO 60%\par FEV1 is always in 30s\par [place] 10/11/19: restricted, FEV1 37%\par [place] 11/11/19: restricted, FEV1 36%\par [place] 12/10/19: restricted, FEV1 38%\par \par SpCx 8/6/18: bacterial cancelled / rare Penicillium sp & numerous Paecilomyces variotii / rare MAC\par SpCx 8/23/18: AFB neg\par SpCx 8/24/18: mixed GNR incl Pseudomonas putis / fungus neg / AFB neg\par Bronch 11/27/18: 67% polys, 13% lymphs / >100k Moraxella in BAL / MAC in BAL and BW\par SpCx 1/25/19: AFB neg final\par SpCx 4/2/19: AFB neg final\par Bronch 7/16/2019: polys, P aeruginosa, rare MAC in BW 8/26, neg in BAL\par \par Impression:\par Bronchiectasis w MAC on tiwk tx since 12/21/18, w Pseudomonas and rare MAC on bronch 7/2019\par Elevated TAs\par \par Plan:\par Check LFTs.\par Continue tiwk MAC tx as follows:\par -azithromycin 500 mg tiwk\par -rifampin 450mg tiwk (changed from 600 mg tiwk on 9/10/19)\par -ethambutol 25 mg/kg tiwk / 104 lbs = 47kg / 1200 mg\par Return 1 month.\par Generally I am planning to treat her for 6 mos more (18 total) and then try stopping.

## 2019-12-10 NOTE — HISTORY OF PRESENT ILLNESS
[FreeTextEntry1] : Cared for by me since 2015.\par \par 1/6/16: Cefdinir for exacerbation of bronchiectasis.\par 2/24/16: Levaquin 500mg x 7 days for pna dx'd at [facility].\par 7/20/17: Again given Levaquin x 7 days by [facility] for infectious symptoms.\par 6/8/18: Hemoptysis, Levaquin.\par 8/23/18: Seen for dyspnea that did not respond to Breo, weight loss, cough.Grew Pseudomonas and tried treating w Levaquin but there was no improvement and in fact she felt markedly worse on Levaquin.\par 12/2018: Bronchoscoped, MAC, started on 3 drugs tiwk 12/21/18.\par 2019 monthly routine visits,  has cardiac amyloid. GI symptoms day of meds, fatigue.\par 7/2019: Bronchoscoped, Pseudomonas, cipro improved nothing.\par \par 9/10/19: Some days 105 lbs, an improvement. Can walk 30 min in park - also an improvement. Saw Dr Marino Starkey. L shoulder pain. Continues to feel fatigued on med days.\par \par 10/11/19: No significant change this month. Weight stable. Was walking 30 min in park and tripped and broke foot - in a hard soled shoe now. Summoned for jury duty - unable.\par \par 11/11/19: Gained a pound. Had 2 days of chills but that went away. Had routine labs w Dr Baez and TAs a little up.\par \par 12/10/19: Still out of commission on med days but has gained 3 lbs on her home scale. Still occ feels chills with no signs of extrapulmonary infection. Notices less squeaking. No interval new problems.

## 2019-12-10 NOTE — ASSESSMENT
[FreeTextEntry1] : Data reviewed: \par \par CT chest LHR 4/22/19:  Interval improvement of right lower lobe and to a lesser extent left lower lobe nodular densities and consolidation likely previously representing superimposed acute bacterial infection. Baseline bibasilar bronchiectasis with chronic wall thickening and scarring and areas of mucous plugging is unchanged in keeping with patient's history of chronic mycobacterial infection.\par \par Spirometry always restricted\par PFT 7/12/18: severe complex restrictive abnormality, no BD response, DLCO 60%\par FEV1 is always in 30s\par [place] 10/11/19: restricted, FEV1 37%\par [place] 11/11/19: restricted, FEV1 36%\par [place] 12/10/19: restricted, FEV1 38%\par \par SpCx 8/6/18: bacterial cancelled / rare Penicillium sp & numerous Paecilomyces variotii / rare MAC\par SpCx 8/23/18: AFB neg\par SpCx 8/24/18: mixed GNR incl Pseudomonas putis / fungus neg / AFB neg\par Bronch 11/27/18: 67% polys, 13% lymphs / >100k Moraxella in BAL / MAC in BAL and BW\par SpCx 1/25/19: AFB neg final\par SpCx 4/2/19: AFB neg final\par Bronch 7/16/2019: polys, P aeruginosa, rare MAC in BW 8/26, neg in BAL\par \par Impression:\par Bronchiectasis w MAC on tiwk tx since 12/21/18, w Pseudomonas and rare MAC on bronch 7/2019\par Elevated TAs\par \par Plan:\par Check LFTs.\par Continue tiwk MAC tx as follows:\par -azithromycin 500 mg tiwk\par -rifampin 450mg tiwk (changed from 600 mg tiwk on 9/10/19)\par -ethambutol 25 mg/kg tiwk / 104 lbs = 47kg / 1200 mg\par Return 1 month.\par Generally I am planning to treat her for 6 mos more (18 total) and then try stopping.

## 2019-12-10 NOTE — HISTORY OF PRESENT ILLNESS
[FreeTextEntry1] : Cared for by me since 2015.\par \par 1/6/16: Cefdinir for exacerbation of bronchiectasis.\par 2/24/16: Levaquin 500mg x 7 days for pna dx'd at [facility].\par 7/20/17: Again given Levaquin x 7 days by [facility] for infectious symptoms.\par 6/8/18: Hemoptysis, Levaquin.\par 8/23/18: Seen for dyspnea that did not respond to Breo, weight loss, cough.Grew Pseudomonas and tried treating w Levaquin but there was no improvement and in fact she felt markedly worse on Levaquin.\par 12/2018: Bronchoscoped, MAC, started on 3 drugs tiwk 12/21/18.\par 2019 monthly routine visits,  has cardiac amyloid. GI symptoms day of meds, fatigue.\par 7/2019: Bronchoscoped, Pseudomonas, cipro improved nothing.\par \par 9/10/19: Some days 105 lbs, an improvement. Can walk 30 min in park - also an improvement. Saw Dr Marino Starkey. L shoulder pain. Continues to feel fatigued on med days.\par \par 10/11/19: No significant change this month. Weight stable. Was walking 30 min in park and tripped and broke foot - in a hard soled shoe now. Summoned for jury duty - unable.\par \par 11/11/19: Gained a pound. Had 2 days of chills but that went away. Had routine labs w Dr Baez and TAs a little up.\par \par 12/10/19: Still out of commission on med days but has gained 3 lbs on her home scale. Still occ feels chills with no signs of extrapulmonary infection. Notices less squeaking. No interval new problems.

## 2019-12-11 LAB
ALBUMIN SERPL ELPH-MCNC: 4.2 G/DL
ALP BLD-CCNC: 35 U/L
ALT SERPL-CCNC: 26 U/L
AST SERPL-CCNC: 36 U/L
BILIRUB DIRECT SERPL-MCNC: 0.1 MG/DL
BILIRUB INDIRECT SERPL-MCNC: 0.1 MG/DL
BILIRUB SERPL-MCNC: 0.2 MG/DL
PROT SERPL-MCNC: 6.5 G/DL

## 2020-01-14 ENCOUNTER — APPOINTMENT (OUTPATIENT)
Dept: PULMONOLOGY | Facility: CLINIC | Age: 75
End: 2020-01-14
Payer: MEDICARE

## 2020-01-14 VITALS
BODY MASS INDEX: 17.36 KG/M2 | WEIGHT: 108 LBS | SYSTOLIC BLOOD PRESSURE: 110 MMHG | DIASTOLIC BLOOD PRESSURE: 88 MMHG | HEIGHT: 66 IN | HEART RATE: 77 BPM | OXYGEN SATURATION: 93 % | TEMPERATURE: 96.7 F

## 2020-01-14 PROCEDURE — 36415 COLL VENOUS BLD VENIPUNCTURE: CPT

## 2020-01-14 PROCEDURE — 99213 OFFICE O/P EST LOW 20 MIN: CPT | Mod: 25

## 2020-01-14 PROCEDURE — 94010 BREATHING CAPACITY TEST: CPT

## 2020-01-14 NOTE — ASSESSMENT
[FreeTextEntry1] : Data reviewed: \par \par CT chest LHR 4/22/19: Interval improvement of right lower lobe and to a lesser extent left lower lobe nodular densities and consolidation likely previously representing superimposed acute bacterial infection. Baseline bibasilar bronchiectasis with chronic wall thickening and scarring and areas of mucous plugging is unchanged in keeping with patient's history of chronic mycobacterial infection.\par \par Spirometry always restricted\par PFT 7/12/18: severe complex restrictive abnormality, no BD response, DLCO 60%\par FEV1 is always in 30s\par [place] 1/14/20: restricted, FEV1 36%\par \par SpCx 8/6/18: bacterial cancelled / rare Penicillium sp & numerous Paecilomyces variotii / rare MAC\par SpCx 8/23/18: AFB neg\par SpCx 8/24/18: mixed GNR incl Pseudomonas putis / fungus neg / AFB neg\par Bronch 11/27/18: 67% polys, 13% lymphs / >100k Moraxella in BAL / MAC in BAL and BW\par SpCx 1/25/19: AFB neg final\par SpCx 4/2/19: AFB neg final\par Bronch 7/16/2019: polys, P aeruginosa, rare MAC in BW 8/26, neg in BAL\par \par Impression:\par Bronchiectasis w MAC on tiwk tx since 12/21/18, w Pseudomonas and rare MAC on bronch 7/2019\par Elevated TAs\par \par Plan:\par Check LFTs.\par Continue tiwk MAC tx as follows:\par -azithromycin 500 mg tiwk\par -rifampin 450mg tiwk (changed from 600 mg tiwk on 9/10/19)\par -ethambutol 25 mg/kg tiwk / 104 lbs = 47kg / 1200 mg\par Return 1 month.\par Generally I am planning to treat her for 18 mos empirically, June 21.

## 2020-01-14 NOTE — REVIEW OF SYSTEMS
[Fatigue] : fatigue [FreeTextEntry7] : upset stomach on food days [Recent Wt Loss (___ Lbs)] : no recent weight loss [As Noted in HPI] : as noted in HPI

## 2020-01-14 NOTE — PHYSICAL EXAM
[General Appearance - In No Acute Distress] : no acute distress [Murmurs] : no murmurs present [Heart Sounds] : normal S1 and S2 [Edema] : no peripheral edema present [Abdomen Tenderness] : non-tender [FreeTextEntry1] : squeaks

## 2020-01-14 NOTE — HISTORY OF PRESENT ILLNESS
[FreeTextEntry1] : Cared for by me since 2015.\par \par 1/6/16: Cefdinir for exacerbation of bronchiectasis.\par 2/24/16: Levaquin 500mg x 7 days for pna dx'd at [facility].\par 7/20/17: Again given Levaquin x 7 days by [facility] for infectious symptoms.\par 6/8/18: Hemoptysis, Levaquin.\par 8/23/18: Seen for dyspnea that did not respond to Breo, weight loss, cough.Grew Pseudomonas and tried treating w Levaquin but there was no improvement and in fact she felt markedly worse on Levaquin.\par 12/2018: Bronchoscoped, MAC, started on 3 drugs tiwk 12/21/18.\par 2019 monthly routine visits,  has cardiac amyloid. GI symptoms day of meds, fatigue.\par 7/2019: Bronchoscoped, Pseudomonas, cipro improved nothing.\par \par 9/10/19: Some days 105 lbs, an improvement. Can walk 30 min in park - also an improvement. Saw Dr Marino Strakey. L shoulder pain. Continues to feel fatigued on med days.\par \par 10/11/19: No significant change this month. Weight stable. Was walking 30 min in park and tripped and broke foot - in a hard soled shoe now. Summoned for jury duty - unable.\par \par 11/11/19: Gained a pound. Had 2 days of chills but that went away. Had routine labs w Dr Baez and TAs a little up.\par \par 12/10/19: Still out of commission on med days but has gained 3 lbs on her home scale. Still occ feels chills with no signs of extrapulmonary infection. Notices less squeaking. No interval new problems.\par \par 1/14/20: No change. Weight stable at 108. Still feels bad on medicine days. No interval problems. Largely staying in due to the weather. Eating.

## 2020-01-15 LAB
ALBUMIN SERPL ELPH-MCNC: 4.5 G/DL
ALP BLD-CCNC: 35 U/L
ALT SERPL-CCNC: 25 U/L
AST SERPL-CCNC: 30 U/L
BILIRUB DIRECT SERPL-MCNC: 0.1 MG/DL
BILIRUB INDIRECT SERPL-MCNC: 0.1 MG/DL
BILIRUB SERPL-MCNC: 0.2 MG/DL
PROT SERPL-MCNC: 6.6 G/DL

## 2020-02-11 ENCOUNTER — APPOINTMENT (OUTPATIENT)
Dept: PULMONOLOGY | Facility: CLINIC | Age: 75
End: 2020-02-11
Payer: MEDICARE

## 2020-02-11 VITALS
TEMPERATURE: 97.9 F | SYSTOLIC BLOOD PRESSURE: 90 MMHG | OXYGEN SATURATION: 93 % | WEIGHT: 108 LBS | HEIGHT: 66 IN | BODY MASS INDEX: 17.36 KG/M2 | HEART RATE: 79 BPM | DIASTOLIC BLOOD PRESSURE: 70 MMHG

## 2020-02-11 PROCEDURE — 99214 OFFICE O/P EST MOD 30 MIN: CPT | Mod: 25

## 2020-02-11 PROCEDURE — 36415 COLL VENOUS BLD VENIPUNCTURE: CPT

## 2020-02-11 NOTE — CONSULT LETTER
[Dear  ___] : Dear  [unfilled], [Courtesy Letter:] : I had the pleasure of seeing your patient, [unfilled], in my office today. [Please see my note below.] : Please see my note below. [Consult Closing:] : Thank you very much for allowing me to participate in the care of this patient.  If you have any questions, please do not hesitate to contact me. [Sincerely,] : Sincerely, [FreeTextEntry2] : Rajinder Baez MD\par [address]\[place] [address] [FreeTextEntry3] : Radha Ramos MD [DrKali  ___] : Dr. DUPREE

## 2020-02-11 NOTE — HISTORY OF PRESENT ILLNESS
[TextBox_4] : Cared for by me since 2015.\par \par 1/6/16: Cefdinir for exacerbation of bronchiectasis.\par 2/24/16: Levaquin 500mg x 7 days for pna dx'd at [facility].\par 7/20/17: Again given Levaquin x 7 days by [facility] for infectious symptoms.\par 6/8/18: Hemoptysis, Levaquin.\par 8/23/18: Seen for dyspnea that did not respond to Breo, weight loss, cough.Grew Pseudomonas and tried treating w Levaquin but there was no improvement and in fact she felt markedly worse on Levaquin.\par 12/2018: Bronchoscoped, MAC, started on 3 drugs tiwk 12/21/18.\par 2019 monthly routine visits,  has cardiac amyloid. GI symptoms day of meds, fatigue.\par 7/2019: Bronchoscoped, Pseudomonas, cipro improved nothing.\par 9/10/19: Can walk 30 min in park. Saw Dr Marnio Starkey. L shoulder pain. Continues to feel fatigued on med days.\par 10/11/19: Was walking 30 min in park and tripped and broke foot - in a hard soled shoe now.\par 11/11/19: Gained a pound. Had 2 days of chills but that went away. Had routine labs w Dr Baez and TAs a little up.\par 12/10/19: Routine.\par \par 1/14/20: No change. Weight stable at 108. Still feels bad on medicine days. No interval problems. Largely staying in due to the weather. Eating. \par \par 2/11/20: No change. Got shingles vaccine Saturday and notes redness of the RUE today. Weight stable 108. Not walking as much.

## 2020-02-11 NOTE — PHYSICAL EXAM
[No Acute Distress] : no acute distress [Normal Rate/Rhythm] : normal rate/rhythm [Normal S1, S2] : normal s1, s2 [No Murmurs] : no murmurs [No Resp Distress] : no resp distress [Clear to Auscultation Bilaterally] : clear to auscultation bilaterally [TextBox_125] : slightly warm erythematous rash over R bicep marked 10:30am w pen

## 2020-02-12 LAB
25(OH)D3 SERPL-MCNC: 55.7 NG/ML
ALBUMIN SERPL ELPH-MCNC: 4.2 G/DL
ALP BLD-CCNC: 34 U/L
ALT SERPL-CCNC: 18 U/L
AST SERPL-CCNC: 27 U/L
BILIRUB DIRECT SERPL-MCNC: 0.1 MG/DL
BILIRUB INDIRECT SERPL-MCNC: 0.1 MG/DL
BILIRUB SERPL-MCNC: 0.2 MG/DL
CHOLEST SERPL-MCNC: 200 MG/DL
CHOLEST/HDLC SERPL: 2.4 RATIO
HDLC SERPL-MCNC: 84 MG/DL
LDLC SERPL CALC-MCNC: 96 MG/DL
PROT SERPL-MCNC: 6.3 G/DL
TRIGL SERPL-MCNC: 98 MG/DL

## 2020-03-10 ENCOUNTER — APPOINTMENT (OUTPATIENT)
Dept: PULMONOLOGY | Facility: CLINIC | Age: 75
End: 2020-03-10
Payer: MEDICARE

## 2020-03-10 VITALS
SYSTOLIC BLOOD PRESSURE: 96 MMHG | OXYGEN SATURATION: 92 % | DIASTOLIC BLOOD PRESSURE: 68 MMHG | HEIGHT: 66 IN | WEIGHT: 108 LBS | HEART RATE: 73 BPM | TEMPERATURE: 96.5 F | BODY MASS INDEX: 17.36 KG/M2

## 2020-03-10 PROCEDURE — 36415 COLL VENOUS BLD VENIPUNCTURE: CPT

## 2020-03-10 PROCEDURE — 99213 OFFICE O/P EST LOW 20 MIN: CPT | Mod: 25

## 2020-03-10 NOTE — PHYSICAL EXAM
[No Acute Distress] : no acute distress [Normal Rate/Rhythm] : normal rate/rhythm [Normal S1, S2] : normal s1, s2 [No Murmurs] : no murmurs [No Resp Distress] : no resp distress [TextBox_68] : ajith [TextBox_125] : slightly warm erythematous rash over R bicep marked 10:30am w pen

## 2020-03-10 NOTE — HISTORY OF PRESENT ILLNESS
[TextBox_4] : Cared for by me since 2015 for bronchiectasis. Has required Abx for exacerbations and pneumonias. In 8/18 had dyspnea that did not respond to Breo, weight loss, cough.Grew Pseudomonas and tried treating w Levaquin but there was no improvement and in fact she felt markedly worse on Levaquin. In 12/2018 bronchoscoped, MAC, started on 3 drugs tiwk 12/21/18.\par \par 3/10/20: No big change. Weight up to 110lbs, great. Fatigued on med days. Having back pain. Started PW&R and has been x 3. Wonders about trying inhalers - discussed that we tried Breo in 8/2018 w no benefit.\par

## 2020-03-10 NOTE — ASSESSMENT
[FreeTextEntry1] : Data reviewed: \par \par CT chest [facility] 4/22/19: Interval improvement of right lower lobe and to a lesser extent left lower lobe nodular densities and consolidation likely previously representing superimposed acute bacterial infection. Baseline bibasilar bronchiectasis with chronic wall thickening and scarring and areas of mucous plugging is unchanged in keeping with patient's history of chronic mycobacterial infection.\par \par Spirometry always restricted\par PFT 7/12/18: severe complex restrictive abnormality, no BD response, DLCO 60%\par FEV1 is always in 30s\par [place] 3/10/20: restricted, FEV1 36%\par \par SpCx 8/6/18: bacterial cancelled / rare Penicillium sp & numerous Paecilomyces variotii / rare MAC\par SpCx 8/23/18: AFB neg\par SpCx 8/24/18: mixed GNR incl Pseudomonas putis / fungus neg / AFB neg\par Bronch 11/27/18: 67% polys, 13% lymphs / >100k Moraxella in BAL / MAC in BAL and BW\par SpCx 1/25/19: AFB neg final\par SpCx 4/2/19: AFB neg final\par Bronch 7/16/2019: polys, P aeruginosa, rare MAC in BW 8/26, neg in BAL\par \par Impression:\par Bronchiectasis w MAC on tiwk tx since 12/21/18, w Pseudomonas and rare MAC on bronch 7/2019\par \par Plan:\par Check LFTs. \par Continue tiwk MAC tx as follows:\par -azithromycin 500 mg tiwk\par -rifampin 450mg tiwk (changed from 600 mg tiwk on 9/10/19)\par -ethambutol 25 mg/kg tiwk / 104 lbs = 47kg / 1200 mg\par Return 1 month.\par Generally I am planning to treat her for 18 mos empirically, June 21. \par \par

## 2020-03-10 NOTE — REVIEW OF SYSTEMS
[Recent Wt Gain (___ Lbs)] : ~T recent [unfilled] lb weight gain [Cough] : no cough [Negative] : Gastrointestinal

## 2020-03-11 LAB
ALBUMIN SERPL ELPH-MCNC: 4.5 G/DL
ALP BLD-CCNC: 35 U/L
ALT SERPL-CCNC: 20 U/L
AST SERPL-CCNC: 33 U/L
BILIRUB DIRECT SERPL-MCNC: 0.1 MG/DL
BILIRUB INDIRECT SERPL-MCNC: 0.1 MG/DL
BILIRUB SERPL-MCNC: 0.2 MG/DL
PROT SERPL-MCNC: 6.6 G/DL

## 2020-06-22 ENCOUNTER — APPOINTMENT (OUTPATIENT)
Dept: PULMONOLOGY | Facility: CLINIC | Age: 75
End: 2020-06-22

## 2020-08-12 ENCOUNTER — APPOINTMENT (OUTPATIENT)
Dept: PULMONOLOGY | Facility: CLINIC | Age: 75
End: 2020-08-12
Payer: MEDICARE

## 2020-08-12 VITALS
WEIGHT: 104 LBS | BODY MASS INDEX: 16.71 KG/M2 | HEART RATE: 82 BPM | OXYGEN SATURATION: 92 % | SYSTOLIC BLOOD PRESSURE: 134 MMHG | DIASTOLIC BLOOD PRESSURE: 88 MMHG | HEIGHT: 66 IN | TEMPERATURE: 98.6 F

## 2020-08-12 PROCEDURE — 71046 X-RAY EXAM CHEST 2 VIEWS: CPT

## 2020-08-12 PROCEDURE — 99214 OFFICE O/P EST MOD 30 MIN: CPT | Mod: 25

## 2020-08-12 NOTE — REVIEW OF SYSTEMS
[Recent Wt Loss (___ Lbs)] : ~T recent [unfilled] lb weight loss [Dyspnea] : dyspnea [Anxiety] : anxiety [Abdominal Pain] : abdominal pain

## 2020-09-22 NOTE — PHYSICAL EXAM
[No Acute Distress] : no acute distress [Normal Rate/Rhythm] : normal rate/rhythm [Normal S1, S2] : normal s1, s2 [No Murmurs] : no murmurs [TextBox_68] : squeaking [TextBox_89] : soft nontender

## 2020-09-22 NOTE — HISTORY OF PRESENT ILLNESS
[TextBox_4] : Cared for by me since 2015 for bronchiectasis. Has required Abx for exacerbations and pneumonias. In 8/18 had dyspnea that did not respond to Breo, weight loss, cough.Grew Pseudomonas and tried treating w Levaquin but there was no improvement and in fact she felt markedly worse on Levaquin. In 12/2018 bronchoscoped, MAC, started on 3 drugs tiwk 12/21/18.\par \par 3/10/20: No big change. Weight up to 110lbs, great. Fatigued on med days. Having back pain. Started PW&R and has been x 3. Wonders about trying inhalers - discussed that we tried Breo in 8/2018 w no benefit.\par \par 8/12/20: Comes in for follow up after completing 3 drug MAC tx in June. Initially had more energy and was walking up to 1/3 mile.  is worsening, she is extremely anxious. Dr Baez put her on citalopram and she developed nausea and stopped it. Notes abdominal "clenching." Lost 4 lbs last week. No appetite. Did have normal BM today. Tremor getting worse.

## 2020-09-22 NOTE — ASSESSMENT
[FreeTextEntry1] : Data reviewed: \par \par CT chest L[facility] 4/22/19: Interval improvement of right lower lobe and to a lesser extent left lower lobe nodular densities and consolidation likely previously representing superimposed acute bacterial infection. Baseline bibasilar bronchiectasis with chronic wall thickening and scarring and areas of mucous plugging is unchanged in keeping with patient's history of chronic mycobacterial infection.\par \par PA/lat CXR in office 8/12/20: pranay inflam changes, no sig change\par \par Spirometry always restricted\par PFT 7/12/18: severe complex restrictive abnormality, no BD response, DLCO 60%\par FEV1 is always in 30s\par [place] 3/10/20: restricted, FEV1 36%\par \par SpCx 8/6/18: bacterial cancelled / rare Penicillium sp & numerous Paecilomyces variotii / rare MAC\par SpCx 8/23/18: AFB neg\par SpCx 8/24/18: mixed GNR incl Pseudomonas putis / fungus neg / AFB neg\par Bronch 11/27/18: 67% polys, 13% lymphs / >100k Moraxella in BAL / MAC in BAL and BW\par SpCx 1/25/19: AFB neg final\par SpCx 4/2/19: AFB neg final\par Bronch 7/16/2019: polys, P aeruginosa, rare MAC in BW 8/26, neg in BAL\par \par Impression:\par Bronchiectasis w MAC on tiwk tx 12/2018-6/2020, w Pseudomonas and rare MAC on bronch 7/2019\par \par Plan:\par Remains cachectic and frail without significant worsening off MAC tx.\par Seeing Dr Reed next week and will have labs then.\par Review abd symptoms w Dr Baez.\par CXR done today as post-treatment baseline and will also get post-treatment CT chest.\par Sent for Covid swab at her request to exclude Covid as cause of abd symptoms.\par Return 3-4 mos for check up.\par --\par CT chest LHR 9/16/20: bronchiectasis, bronchiolitis w inc inflammatorychanges in RLL

## 2020-09-22 NOTE — CONSULT LETTER
[Dear  ___] : Dear  [unfilled], [Courtesy Letter:] : I had the pleasure of seeing your patient, [unfilled], in my office today. [Please see my note below.] : Please see my note below. [Sincerely,] : Sincerely, [Consult Closing:] : Thank you very much for allowing me to participate in the care of this patient.  If you have any questions, please do not hesitate to contact me. [DrKali  ___] : Dr. DUPREE [FreeTextEntry2] : Rajinder Baez MD\par [address]\[place] [address] [FreeTextEntry3] : Radha Ramos MD

## 2020-10-01 ENCOUNTER — APPOINTMENT (OUTPATIENT)
Dept: ENDOCRINOLOGY | Facility: CLINIC | Age: 75
End: 2020-10-01
Payer: MEDICARE

## 2020-10-01 VITALS
DIASTOLIC BLOOD PRESSURE: 87 MMHG | SYSTOLIC BLOOD PRESSURE: 144 MMHG | HEART RATE: 81 BPM | WEIGHT: 103 LBS | BODY MASS INDEX: 16.62 KG/M2

## 2020-10-01 VITALS — HEIGHT: 65 IN | BODY MASS INDEX: 17.14 KG/M2

## 2020-10-01 PROCEDURE — 99203 OFFICE O/P NEW LOW 30 MIN: CPT

## 2020-10-01 NOTE — REVIEW OF SYSTEMS
[Recent Weight Loss (___ Lbs)] : recent weight loss: [unfilled] lbs [Hair Loss] : hair loss [All other systems negative] : All other systems negative

## 2020-10-08 NOTE — DATA REVIEWED
[FreeTextEntry1] : 5/20: tot chol 234, trig 88, HDL 96, \par 2/20: 25D 55.7\par \par bone density, Hologic, 7/20:\par L1-L4 T -2.5, prev -2.5 in 2018, -2.1 in 2016, -1.8 in 2014\par fem neck 0.637, T -1.9, prev -1.6 (2018, 2016, 2014), -1.1 (2012)\par tot hip 0.661 T -2.3, prev -2.3 (2018), -1.8 (2016), -1.4 (2014)

## 2020-10-08 NOTE — CONSULT LETTER
[Dear  ___] : Dear  [unfilled], [Consult Letter:] : I had the pleasure of evaluating your patient, [unfilled]. [Please see my note below.] : Please see my note below. [Consult Closing:] : Thank you very much for allowing me to participate in the care of this patient.  If you have any questions, please do not hesitate to contact me. [Sincerely,] : Sincerely, [FreeTextEntry1] : Ms. Resendez has osteoporosis and I agree with her current treatment, weekly alendronate, which I plan to continue for 4-5 years total, and then give her a drug holiday (provided that her bone density is stable or hopefully, improved).\par  [FreeTextEntry3] : Susanna Lloyd MD\par Division of Endocrinology\par [facility]

## 2020-10-08 NOTE — ASSESSMENT
[FreeTextEntry1] : Osteoporosis.  main risk factor seems to be low body weight.\par Undoubtedly, she has lost muscle mass in the weight she lost.\par Agree with alendronate for first line osteoporosis treatment, for low bone density and no history of fractures.  would continue for 4-5 years and then give drug holiday if bone density is stable or improving.  next bone density due 7/2022.\par Discussed calcium supplementation with patient.  Total recommended dietary calcium intake is 1200mg  but she only has to supplement what she doesn't get in her diet. I recommend getting half of calcium requirement from diet (through vegetable, fish, dairy, aim for 3 servings/day) and half from supplementation (500-600mg calcium).  Since she is on PPI, would be better to take calcium citrate which does not require acidic environment for optimal absorption. Her vitamin D is replete.\par Recommended balance and muscle strengthening exercises (modified squats, walking stairs)\par RTO 1 year

## 2020-10-08 NOTE — PHYSICAL EXAM
[Alert] : alert [Healthy Appearance] : healthy appearance [No Proptosis] : no proptosis [No Lid Lag] : no lid lag [Normal Hearing] : hearing was normal [No LAD] : no lymphadenopathy [Thyroid Not Enlarged] : the thyroid was not enlarged [Clear to Auscultation] : lungs were clear to auscultation bilaterally [Normal S1, S2] : normal S1 and S2 [Regular Rhythm] : with a regular rhythm [No Spinal Tenderness] : no spinal tenderness [Kyphosis] : kyphosis present [Scoliosis] : scoliosis present [Normal Affect] : the affect was normal [Normal Mood] : the mood was normal [Acanthosis Nigricans] : no acanthosis nigricans

## 2020-10-14 ENCOUNTER — APPOINTMENT (OUTPATIENT)
Dept: PULMONOLOGY | Facility: CLINIC | Age: 75
End: 2020-10-14
Payer: MEDICARE

## 2020-10-14 VITALS
HEIGHT: 65 IN | SYSTOLIC BLOOD PRESSURE: 110 MMHG | DIASTOLIC BLOOD PRESSURE: 80 MMHG | HEART RATE: 71 BPM | TEMPERATURE: 97.1 F | OXYGEN SATURATION: 93 % | WEIGHT: 104 LBS | BODY MASS INDEX: 17.33 KG/M2

## 2020-10-14 PROCEDURE — 99214 OFFICE O/P EST MOD 30 MIN: CPT

## 2020-10-14 NOTE — ASSESSMENT
[FreeTextEntry1] : Data reviewed: \par \par CT chest LHR 9/16/20 personally reviewed : bronchiectasis, bronchiolitis w inc inflammatory changes in RLL\par \par PA/lat CXR in office 8/12/20: pranay inflam changes, no sig change\par \par Spirometry always restricted\par PFT 7/12/18: severe complex restrictive abnormality, no BD response, DLCO 60%\par FEV1 is always in 30s\par [place] 3/10/20: restricted, FEV1 36%\par \par SpCx 8/6/18: bacterial cancelled / rare Penicillium sp & numerous Paecilomyces variotii / rare MAC\par SpCx 8/23/18: AFB neg\par SpCx 8/24/18: mixed GNR incl Pseudomonas putis / fungus neg / AFB neg\par Bronch 11/27/18: 67% polys, 13% lymphs / >100k Moraxella in BAL / MAC in BAL and BW\par SpCx 1/25/19: AFB neg final\par SpCx 4/2/19: AFB neg final\par Bronch 7/16/2019: polys, P aeruginosa, rare MAC in BW 8/26, neg in BAL\par \par Impression:\par Bronchiectasis w MAC on tiwk tx 12/2018-6/2020, w Pseudomonas and rare MAC on bronch 7/2019\par \par Plan:\par In her usual not great condition, but without any sig worsening off MAC tx.\par Some worse cough and sputum today may be partially GI in origin. Cont omeprazole.\par Has not responded to Breo in past, has also not improved w Levaquin.\par Will culture sputum.\par Try 3% saline nebs and try using the Acapella that she already has but has never used.\par Had flu shot.\par RTO 3 mos.

## 2020-10-14 NOTE — REVIEW OF SYSTEMS
[Negative] : Cardiovascular [Recent Wt Gain (___ Lbs)] : ~T no recent weight gain [Recent Wt Loss (___ Lbs)] : ~T no recent weight loss

## 2020-10-14 NOTE — HISTORY OF PRESENT ILLNESS
[TextBox_4] : Cared for by me since 2015 for bronchiectasis. Has required Abx for exacerbations and pneumonias. In 8/18 had dyspnea that did not respond to Breo, weight loss, cough. Grew Pseudomonas and tried treating w Levaquin but there was no improvement and in fact she felt markedly worse on Levaquin. In 12/2018 bronchoscoped, MAC, started on 3 drugs tiwk 12/21/18 and completed 6/2020. Started PW&R in 3/2020 but deraiiled by pandemic.\par \par 10/14/20: Complains of cough w eating, not related to temperature, doesn't report aspiration, coughs after finishing a meal. And dyspneic. No fever. Having more sputum than usual. No chest pain. Started prilosec about a month ago, had endoscopy, has a moderate HH. Can't gain weight, though not losing. Still seeing Dr Woods for the tremor. Still significant anxiety,  doing poorly.

## 2020-10-14 NOTE — CONSULT LETTER
[Dear  ___] : Dear  [unfilled], [Courtesy Letter:] : I had the pleasure of seeing your patient, [unfilled], in my office today. [Please see my note below.] : Please see my note below. [Consult Closing:] : Thank you very much for allowing me to participate in the care of this patient.  If you have any questions, please do not hesitate to contact me. [Sincerely,] : Sincerely, [FreeTextEntry2] : Rajinder Baez MD\par [address]\[place] [address] [FreeTextEntry3] : Radha Ramos MD [DrKali  ___] : Dr. DUPREE

## 2020-11-16 LAB — FUNGUS SPT CULT: NORMAL

## 2020-12-01 LAB — ACID FAST STN SPT: ABNORMAL

## 2021-01-20 ENCOUNTER — APPOINTMENT (OUTPATIENT)
Dept: PULMONOLOGY | Facility: CLINIC | Age: 76
End: 2021-01-20
Payer: MEDICARE

## 2021-01-20 VITALS
SYSTOLIC BLOOD PRESSURE: 110 MMHG | HEIGHT: 65 IN | BODY MASS INDEX: 17.16 KG/M2 | DIASTOLIC BLOOD PRESSURE: 70 MMHG | WEIGHT: 103 LBS | TEMPERATURE: 97.1 F

## 2021-01-20 PROCEDURE — 99213 OFFICE O/P EST LOW 20 MIN: CPT

## 2021-01-20 RX ORDER — AZITHROMYCIN 500 MG/1
500 TABLET, FILM COATED ORAL
Qty: 9 | Refills: 0 | Status: DISCONTINUED | COMMUNITY
Start: 2018-12-18 | End: 2021-01-20

## 2021-01-20 RX ORDER — ETHAMBUTOL HYDROCHLORIDE 400 MG/1
400 TABLET ORAL
Qty: 117 | Refills: 3 | Status: DISCONTINUED | COMMUNITY
Start: 2018-12-18 | End: 2021-01-20

## 2021-01-20 RX ORDER — RIFAMPIN 150 MG/1
150 CAPSULE ORAL
Qty: 45 | Refills: 0 | Status: DISCONTINUED | COMMUNITY
Start: 2018-12-18 | End: 2021-01-20

## 2021-01-20 NOTE — PHYSICAL EXAM
[No Acute Distress] : no acute distress [Normal Rate/Rhythm] : normal rate/rhythm [Normal S1, S2] : normal s1, s2 [No Murmurs] : no murmurs [TextBox_68] : few crackles, relatively clear

## 2021-01-20 NOTE — HISTORY OF PRESENT ILLNESS
[TextBox_4] : Cared for by me since 2015 for bronchiectasis. Has required Abx for exacerbations and pneumonias. In 8/18 had dyspnea that did not respond to Breo, weight loss, cough. Grew Pseudomonas and tried treating w Levaquin but there was no improvement and in fact she felt markedly worse on Levaquin. In 12/2018 bronchoscoped, MAC, started on 3 drugs tiwk 12/21/18 and completed 6/2020. Started PW&R in 3/2020 but deraiiled by pandemic.\par \par 10/14/20: Complains of cough w eating, not related to temperature, doesn't report aspiration, coughs after finishing a meal. And dyspneic. No fever. Having more sputum than usual. No chest pain. Started prilosec about a month ago, had endoscopy, has a moderate HH. Can't gain weight, though not losing. Still seeing Dr Woods for the tremor. Still significant anxiety,  doing poorly.\par \par 1/20/21:  fell at [facility], hospitalized for 2 weeks and now in [facility], daughter having a breast bx today. She has been off Abx x 6 mos and feels that her life is better off meds. Weight is stably low, not losing. Coughing every day, wakes her from sleep, bringing up no sputum, just a dry cough. Can produce sputum w the saline nebs, which she uses once a day. Has Acapella but doesn't know how to use it. Coughs mostly when she sits down or after eating.

## 2021-01-20 NOTE — ASSESSMENT
[FreeTextEntry1] : Data reviewed: \par \par CT chest LHR 9/16/20 : bronchiectasis, bronchiolitis w inc inflammatory changes in RLL\par \par Spirometry always restricted\par PFT 7/12/18: severe complex restrictive abnormality, no BD response, DLCO 60%\par FEV1 is always in 30s\par [place] 3/10/20: restricted, FEV1 36%\par \par SpCx 8/6/18: bacterial cancelled / rare Penicillium sp & numerous Paecilomyces variotii / rare MAC\par SpCx 8/23/18: AFB neg\par SpCx 8/24/18: mixed GNR incl Pseudomonas putis / fungus neg / AFB neg\par Bronch 11/27/18: 67% polys, 13% lymphs / >100k Moraxella in BAL / MAC in BAL and BW\par SpCx 1/25/19: AFB neg final\par SpCx 4/2/19: AFB neg final\par Bronch 7/16/2019: polys, P aeruginosa, rare MAC in BW 8/26, neg in BAL\par \par SpCx 12/1/20: MAC in broth / bact cancelled / fungus neg\par \par Impression:\par Bronchiectasis w MAC on tiwk tx 12/2018-6/2020, w Pseudomonas and rare MAC on bronch 7/2019\par \par Plan:\par Still no sig worsening off MAC tx and in fact generally feels better, though has MAC in sputum now.\par Cont 3% saline nebs. I showed her how to use Acapella and gave her written instructions.\par Try nebs then Acapella daily for now.\par Try to get Covid shot.\par RTO 3 mos.

## 2021-04-20 ENCOUNTER — APPOINTMENT (OUTPATIENT)
Dept: PULMONOLOGY | Facility: CLINIC | Age: 76
End: 2021-04-20

## 2021-08-13 NOTE — REASON FOR VISIT
89 yr old female with mild dementia, HTN, HLD, PE (on a/c with eliquis) presents from home for concern of no BM x 4-5 days.    Pt states she has had a diminished appetite but has been tolerating PO, passing gas. Currently denying abdominal pain but noted to have it at home. Her aide called her PMD who instructed her to come to the ED to r/o an obstruction.     Of note, patient was seen here last week for broken clavicle s/p fall, denies taking opioids for the pain, has not needed pain medications.    Pt also states that she fell 2-3 days ago.  States it was unwitnessed while she was getting up from using the bathroom, unsure if she hit her head, but did not seek medical attention at the time. She is unsure how long she was on the floor for. She currently endorses left shoulder pain and some left sided chest pain with inspiration that is new. She denies shortness of breath.      Pt denies any recent fevers, chills, lightheaded or dizzines, nausea, vomiting, abdominal pain, difficulty breathing, back pain, numbness, tingling, paresthesias.   Pt states she has been taking eliquis as prescribed daily, has a live in aid who is with the pt 24/7. [Follow-Up] : a follow-up visit Yes 89 yr old female with mild dementia, HTN, HLD, PE (on a/c with eliquis) presents from home for concern of no BM x 4-5 days.    Pt states she has had a diminished appetite but has been tolerating PO, passing gas. Currently denying abdominal pain but noted to have it at home. Her aide called her PMD who instructed her to come to the ED to r/o an obstruction.     Of note, patient was seen here last week for broken clavicle s/p fall, denies taking opioids for the pain, has not needed pain medications.    Pt also states that she fell 2-3 days ago.  States it was unwitnessed while she was getting up from using the bathroom, unsure if she hit her head, but did not seek medical attention at the time. She is unsure how long she was on the floor for. She currently endorses left shoulder pain and some left sided chest pain with inspiration that is new. She denies shortness of breath.     Pt denies any recent fevers, chills, lightheaded or dizzines, nausea, vomiting, abdominal pain, difficulty breathing, back pain, numbness, tingling, paresthesias.   Pt states she has been taking eliquis as prescribed daily, has a live in aid who is with the pt 24/7. 89 yr old female with mild dementia, HTN, HLD, PE (on a/c with eliquis) presents from home for concern of no BM x 4-5 days. Pt states she has had a diminished appetite but has been tolerating PO and passing gas. Currently denying abdominal pain but noted to have it at home. Her aide called her PMD who instructed her to come to the ED to r/o an obstruction.     Of note, patient was seen here last week for L clavicle fracture s/p fall, denies taking opioids for the pain, has not needed pain medications.    Pt also states that she fell ?again 2-3 days ago.  States it was unwitnessed while she was getting up from using the bathroom, unsure if she hit her head, but did not seek medical attention at the time. She is unsure how long she was on the floor for. She currently endorses left shoulder pain and some left sided chest pain with inspiration that is new. She denies shortness of breath. Son did not know about fall and was not able to provide information.     Pt denies any recent fevers, chills, lightheaded or dizzines, nausea, vomiting, abdominal pain, difficulty breathing, back pain, numbness, tingling, paresthesias.   Pt states she has been taking eliquis as prescribed daily, has a live in aid who is with the pt 24/7.    Son concerned about frequent falls and new fractures. Concerned if he needs to find a new aide or start considering nursing home for mom. Son and wife live in [place]. Patient currently lives in a two story home and has her bedroom and bathroom upstairs; using an electric chair to get to second floor.

## 2022-05-11 ENCOUNTER — RESULT REVIEW (OUTPATIENT)
Age: 77
End: 2022-05-11

## 2022-07-05 ENCOUNTER — NON-APPOINTMENT (OUTPATIENT)
Age: 77
End: 2022-07-05

## 2022-07-05 ENCOUNTER — APPOINTMENT (OUTPATIENT)
Dept: PULMONOLOGY | Facility: CLINIC | Age: 77
End: 2022-07-05

## 2022-07-05 ENCOUNTER — RESULT REVIEW (OUTPATIENT)
Age: 77
End: 2022-07-05

## 2022-07-05 VITALS
HEART RATE: 72 BPM | OXYGEN SATURATION: 92 % | HEIGHT: 65 IN | BODY MASS INDEX: 16.66 KG/M2 | TEMPERATURE: 97.1 F | SYSTOLIC BLOOD PRESSURE: 120 MMHG | DIASTOLIC BLOOD PRESSURE: 66 MMHG | WEIGHT: 100 LBS

## 2022-07-05 DIAGNOSIS — Z86.69 PERSONAL HISTORY OF OTHER DISEASES OF THE NERVOUS SYSTEM AND SENSE ORGANS: ICD-10-CM

## 2022-07-05 DIAGNOSIS — Z82.49 FAMILY HISTORY OF ISCHEMIC HEART DISEASE AND OTHER DISEASES OF THE CIRCULATORY SYSTEM: ICD-10-CM

## 2022-07-05 LAB — EPWORTH SCORE: 3

## 2022-07-05 PROCEDURE — 99204 OFFICE O/P NEW MOD 45 MIN: CPT | Mod: 25

## 2022-07-05 PROCEDURE — 94010 BREATHING CAPACITY TEST: CPT

## 2022-07-05 RX ORDER — HYDROCODONE BITARTRATE AND HOMATROPINE METHYLBROMIDE 5; 1.5 MG/5ML; MG/5ML
5-1.5 SOLUTION ORAL
Qty: 200 | Refills: 0 | Status: DISCONTINUED | COMMUNITY
Start: 2018-11-30 | End: 2022-07-05

## 2022-07-05 RX ORDER — ACETAMINOPHEN AND CODEINE 300; 30 MG/1; MG/1
300-30 TABLET ORAL
Qty: 30 | Refills: 0 | Status: DISCONTINUED | COMMUNITY
Start: 2019-03-13 | End: 2022-07-05

## 2022-07-05 RX ORDER — AMITRIPTYLINE HYDROCHLORIDE 10 MG/1
10 TABLET, FILM COATED ORAL
Qty: 60 | Refills: 0 | Status: ACTIVE | COMMUNITY
Start: 2022-04-11

## 2022-07-05 RX ORDER — FLUOROURACIL 50 MG/G
5 CREAM TOPICAL
Qty: 40 | Refills: 0 | Status: DISCONTINUED | COMMUNITY
Start: 2018-07-09 | End: 2022-07-05

## 2022-07-05 RX ORDER — METHYLPREDNISOLONE 4 MG/1
4 TABLET ORAL
Qty: 21 | Refills: 0 | Status: DISCONTINUED | COMMUNITY
Start: 2019-03-15 | End: 2022-07-05

## 2022-07-05 RX ORDER — SOFT LENS DISINFECTANT
SOLUTION, NON-ORAL MISCELLANEOUS
Qty: 1 | Refills: 0 | Status: DISCONTINUED | COMMUNITY
Start: 2020-10-14 | End: 2022-07-05

## 2022-07-05 RX ORDER — SODIUM CHLORIDE FOR INHALATION 3.5 %
3.5 VIAL, NEBULIZER (ML) INHALATION
Qty: 1 | Refills: 11 | Status: DISCONTINUED | COMMUNITY
Start: 2020-10-14 | End: 2022-07-05

## 2022-07-05 RX ORDER — ALENDRONATE SODIUM 70 MG/1
70 TABLET ORAL
Qty: 13 | Refills: 3 | Status: DISCONTINUED | COMMUNITY
Start: 2019-02-19 | End: 2022-07-05

## 2022-07-05 RX ORDER — CARBIDOPA AND LEVODOPA 25; 100 MG/1; MG/1
25-100 TABLET ORAL
Qty: 30 | Refills: 0 | Status: ACTIVE | COMMUNITY
Start: 2022-05-20

## 2022-07-05 RX ORDER — CYCLOBENZAPRINE HYDROCHLORIDE 5 MG/1
5 TABLET, FILM COATED ORAL
Qty: 30 | Refills: 0 | Status: DISCONTINUED | COMMUNITY
Start: 2018-09-21 | End: 2022-07-05

## 2022-07-05 NOTE — ASSESSMENT
[FreeTextEntry1] : above was discussed at length with the patient and her daughter who have an excellent understanding of the issues.

## 2022-07-05 NOTE — PHYSICAL EXAM
[No Acute Distress] : no acute distress [Normal Appearance] : normal appearance [No Neck Mass] : no neck mass [Normal Rate/Rhythm] : normal rate/rhythm [No Murmurs] : no murmurs [No Resp Distress] : no resp distress [No Abnormalities] : no abnormalities [Benign] : benign [No Clubbing] : no clubbing [No Cyanosis] : no cyanosis [No Edema] : no edema [Normal Color/ Pigmentation] : normal color/ pigmentation [No Focal Deficits] : no focal deficits [Oriented x3] : oriented x3 [Normal Affect] : normal affect [Low Lying Soft Palate] : low lying soft palate [Nasal congestion] : nasal congestion [III] : Mallampati Class: III [TextBox_11] : mildly deviated septum, dry nasal mucosa, cobblestone o/p [TextBox_54] : split S2 [TextBox_68] : diffuse rhonchi, inspiratory rales anterior and posterior, increased posterior mid lung zone, scattered expiratory wheeze [TextBox_99] : + atrophy all 4 extremities

## 2022-07-05 NOTE — REVIEW OF SYSTEMS
[Dry Mouth] : dry mouth [Cough] : cough [Sputum] : sputum [SOB on Exertion] : sob on exertion [Seasonal Allergies] : seasonal allergies [Menopause] : menopause [Chronic Pain] : chronic pain [Depression] : depression [Anxiety] : anxiety [Thyroid Problem] : thyroid problem [Negative] : Endocrine [TextBox_14] : at night

## 2022-07-05 NOTE — HISTORY OF PRESENT ILLNESS
[Never] : never [TextBox_4] : I was asked to consult on this pt by Dr Arzate for bronchiectasis. Patient under prior care of Dr. Sepideh Ramos and Dr. Gavino Ramirez.\par Patient is a 76 y/o WW, lifelong nonsmoker, former  for [employer] w/ a PMHx sig for lung nodule, MAC and PNA. Patient states she is not doing any better or any worse over the past 6 months. She usually has a episode of coughing/ day, although some days she has 1-2 more episodes than usual. She describes the cough as "pretty deep" and it is productive of clear phlegm. She notes that changing from sitting to standing and from lying down to sitting up sometimes triggers her cough. She does not feel that rolling over in bed affects the cough nor does eating or swallowing. She owns a SmartVest and uses it "most of the time," but sometimes she finds it hard to put on because it takes up a lot of her energy to do so. She walks at least 30- 40 min/day and usually walks 0.5-1 mi in that time. She has never done pulmonary rehab. She does not tend to get SOB and only recalls 2 isolated incidents of SOB recently. She denies CP, pressure, tightness, or heartburn. She says she sleeps well at night and denies daytime sleepiness. She wakes up 1x in the middle of the night at most. She denies LE cramps and does not tend to move or kick in her sleep. She denies snoring but does note having a dry mouth sometimes for which she uses Biotene. She also reports some rhinorrhea but denies post-nasal drip and nasal congestion. She denies LE edema. She lost 16 lbs between 11/2/2018 and 12/14/2018 (120 -> 104) and she has not been able to gain back any weight since then. She is currently down to 100 lbs. She normally has a good appetite but lately has been struggling w/ chronic nausea. She is taking vitamin D3 supplementation regularly. \par \par \par \par

## 2022-07-06 ENCOUNTER — LABORATORY RESULT (OUTPATIENT)
Age: 77
End: 2022-07-06

## 2022-07-06 LAB
ALBUMIN SERPL ELPH-MCNC: 4.6 G/DL
ALP BLD-CCNC: 60 U/L
ALT SERPL-CCNC: 12 U/L
ANION GAP SERPL CALC-SCNC: 13 MMOL/L
AST SERPL-CCNC: 27 U/L
BASOPHILS # BLD AUTO: 0.04 K/UL
BASOPHILS NFR BLD AUTO: 0.6 %
BILIRUB SERPL-MCNC: 0.3 MG/DL
BUN SERPL-MCNC: 13 MG/DL
CALCIUM SERPL-MCNC: 10 MG/DL
CHLORIDE SERPL-SCNC: 99 MMOL/L
CO2 SERPL-SCNC: 28 MMOL/L
CREAT SERPL-MCNC: 0.77 MG/DL
CRP SERPL-MCNC: <3 MG/L
EGFR: 80 ML/MIN/1.73M2
EOSINOPHIL # BLD AUTO: 0.06 K/UL
EOSINOPHIL NFR BLD AUTO: 0.9 %
ERYTHROCYTE [SEDIMENTATION RATE] IN BLOOD BY WESTERGREN METHOD: 28 MM/HR
FERRITIN SERPL-MCNC: 29 NG/ML
GLUCOSE SERPL-MCNC: 92 MG/DL
HCT VFR BLD CALC: 43.6 %
HGB BLD-MCNC: 13.6 G/DL
IMM GRANULOCYTES NFR BLD AUTO: 0.3 %
IRON SATN MFR SERPL: 16 %
IRON SERPL-MCNC: 56 UG/DL
LYMPHOCYTES # BLD AUTO: 0.89 K/UL
LYMPHOCYTES NFR BLD AUTO: 14.1 %
MAGNESIUM SERPL-MCNC: 2.2 MG/DL
MAN DIFF?: NORMAL
MCHC RBC-ENTMCNC: 28.2 PG
MCHC RBC-ENTMCNC: 31.2 GM/DL
MCV RBC AUTO: 90.3 FL
MONOCYTES # BLD AUTO: 0.3 K/UL
MONOCYTES NFR BLD AUTO: 4.7 %
NEUTROPHILS # BLD AUTO: 5.02 K/UL
NEUTROPHILS NFR BLD AUTO: 79.4 %
PLATELET # BLD AUTO: 280 K/UL
POTASSIUM SERPL-SCNC: 4.4 MMOL/L
PROT SERPL-MCNC: 7.4 G/DL
RBC # BLD: 4.83 M/UL
RBC # FLD: 14.1 %
RHEUMATOID FACT SER QL: 11 IU/ML
SODIUM SERPL-SCNC: 140 MMOL/L
TIBC SERPL-MCNC: 356 UG/DL
UIBC SERPL-MCNC: 300 UG/DL
WBC # FLD AUTO: 6.33 K/UL

## 2022-07-07 ENCOUNTER — LABORATORY RESULT (OUTPATIENT)
Age: 77
End: 2022-07-07

## 2022-07-08 ENCOUNTER — LABORATORY RESULT (OUTPATIENT)
Age: 77
End: 2022-07-08

## 2022-07-08 LAB
ANA SER IF-ACNC: NEGATIVE
CCP AB SER IA-ACNC: <8 UNITS
ENA SS-A AB SER IA-ACNC: <0.2 AL
ENA SS-B AB SER IA-ACNC: <0.2 AL
M TB IFN-G BLD-IMP: NEGATIVE
QUANTIFERON TB PLUS MITOGEN MINUS NIL: 2.56 IU/ML
QUANTIFERON TB PLUS NIL: 0.02 IU/ML
QUANTIFERON TB PLUS TB1 MINUS NIL: -0.01 IU/ML
QUANTIFERON TB PLUS TB2 MINUS NIL: -0.01 IU/ML
RF+CCP IGG SER-IMP: NEGATIVE
TOTAL IGE SMQN RAST: 98 KU/L

## 2022-07-11 ENCOUNTER — APPOINTMENT (OUTPATIENT)
Dept: GASTROENTEROLOGY | Facility: CLINIC | Age: 77
End: 2022-07-11

## 2022-07-11 VITALS
HEART RATE: 64 BPM | BODY MASS INDEX: 16.66 KG/M2 | SYSTOLIC BLOOD PRESSURE: 110 MMHG | HEIGHT: 65 IN | WEIGHT: 100 LBS | TEMPERATURE: 97.2 F | DIASTOLIC BLOOD PRESSURE: 60 MMHG | OXYGEN SATURATION: 96 %

## 2022-07-11 DIAGNOSIS — R11.0 NAUSEA: ICD-10-CM

## 2022-07-11 DIAGNOSIS — K59.09 OTHER CONSTIPATION: ICD-10-CM

## 2022-07-11 PROCEDURE — 99204 OFFICE O/P NEW MOD 45 MIN: CPT

## 2022-07-11 RX ORDER — CLOTRIMAZOLE AND BETAMETHASONE DIPROPIONATE 10; .5 MG/G; MG/G
1-0.05 CREAM TOPICAL
Qty: 45 | Refills: 0 | Status: DISCONTINUED | COMMUNITY
Start: 2022-05-11 | End: 2022-07-11

## 2022-07-11 RX ORDER — ONDANSETRON 4 MG/1
4 TABLET, ORALLY DISINTEGRATING ORAL
Qty: 20 | Refills: 0 | Status: DISCONTINUED | COMMUNITY
Start: 2022-06-27 | End: 2022-07-11

## 2022-07-11 RX ORDER — FLUCONAZOLE 150 MG/1
150 TABLET ORAL
Qty: 1 | Refills: 0 | Status: DISCONTINUED | COMMUNITY
Start: 2022-04-21 | End: 2022-07-11

## 2022-07-11 NOTE — PHYSICAL EXAM
[General Appearance - Alert] : alert [General Appearance - In No Acute Distress] : in no acute distress [FreeTextEntry1] : Cachectic [Sclera] : the sclera and conjunctiva were normal [Outer Ear] : the ears and nose were normal in appearance [Neck Appearance] : the appearance of the neck was normal [] : no respiratory distress [Apical Impulse] : the apical impulse was normal [Abdomen Soft] : soft [Abdomen Tenderness] : non-tender [Abnormal Walk] : normal gait [Skin Color & Pigmentation] : normal skin color and pigmentation [Cranial Nerves] : cranial nerves 2-12 were intact [Oriented To Time, Place, And Person] : oriented to person, place, and time

## 2022-07-11 NOTE — ASSESSMENT
[FreeTextEntry1] : Nausea resolved.  Possibly due to medication side effect (amitriptyline? Sinemet?) vs. dysmotility due to Parkinson's.  Feeling well now.\par \par If recurs would obtain a CT A/P as initial workup.\par \par Will start miralax PRN for constipation.\par \par Thank you for referring Ms. ROLON.  Please do not hesitate to call to further discuss his/her care.\par \par Note faxed to requesting MD.\par \par

## 2022-07-11 NOTE — HISTORY OF PRESENT ILLNESS
[FreeTextEntry1] : Ms. Resendez is a pleasant 77F h/o brochiectasis, Parkinson's, hypothyroid who is referred by Dr. Worley for nausea.\par \par She was recently diagnosed with PD approximately 1 month ago, started on sinemet at that time. She is slowly up titrating her dose. Was diagnosed due to a tremor.\par \par Her  passed 1 week ago due to amyloidosis.\par \par Around that time she developed significant nausea, was using zofran which helped, her symptoms resolved on 7/7/22 for unclear reasons, she has felt well since then.\par \par She has been on amitriptyline for several years, she is not sure why she was on it, she is being weened off of it due to potential side effects.\par \par She has had no vomiting.  Weight has been stable, although she realizes she is underweight.\par \par Is now following with Dr. Boothe for bronchiectasis.\par \par Also c/o constipation, has a BM every 3-4 days, has not tried anything for this.\par \par Had a colonoscopy in [place] 2 years ago, no polyps.\par \shannan Does not drink or smoke.

## 2022-07-13 ENCOUNTER — RESULT REVIEW (OUTPATIENT)
Age: 77
End: 2022-07-13

## 2022-08-09 ENCOUNTER — APPOINTMENT (OUTPATIENT)
Dept: PULMONOLOGY | Facility: CLINIC | Age: 77
End: 2022-08-09

## 2022-08-10 LAB — FUNGUS SPT CULT: NORMAL

## 2022-08-11 ENCOUNTER — APPOINTMENT (OUTPATIENT)
Dept: PULMONOLOGY | Facility: CLINIC | Age: 77
End: 2022-08-11

## 2022-08-11 VITALS
TEMPERATURE: 96.8 F | SYSTOLIC BLOOD PRESSURE: 92 MMHG | HEIGHT: 65 IN | HEART RATE: 75 BPM | WEIGHT: 100 LBS | BODY MASS INDEX: 16.66 KG/M2 | DIASTOLIC BLOOD PRESSURE: 60 MMHG | OXYGEN SATURATION: 94 %

## 2022-08-11 PROCEDURE — 99215 OFFICE O/P EST HI 40 MIN: CPT

## 2022-08-11 NOTE — PHYSICAL EXAM
[No Acute Distress] : no acute distress [Low Lying Soft Palate] : low lying soft palate [Normal Appearance] : normal appearance [No Neck Mass] : no neck mass [Normal Rate/Rhythm] : normal rate/rhythm [No Murmurs] : no murmurs [No Resp Distress] : no resp distress [No Abnormalities] : no abnormalities [Benign] : benign [No Clubbing] : no clubbing [No Cyanosis] : no cyanosis [No Edema] : no edema [Normal Color/ Pigmentation] : normal color/ pigmentation [No Focal Deficits] : no focal deficits [Oriented x3] : oriented x3 [Normal Affect] : normal affect [Turbinate hypertrophy] : turbinate hypertrophy [IV] : Mallampati Class: IV [Normal S1, S2] : normal s1, s2 [TextBox_11] : deviated septum, nasal dryness, dry MM, crowded cobblestone o/p [TextBox_68] : diffuse rhonchi, expiratory wheeze, scattered rales [TextBox_99] : + atrophy all 4 extremities

## 2022-08-11 NOTE — REVIEW OF SYSTEMS
[Fatigue] : fatigue [Cough] : cough [Sputum] : sputum [Dyspnea] : dyspnea [SOB on Exertion] : sob on exertion [Seasonal Allergies] : seasonal allergies [Negative] : Endocrine [TextBox_3] : weak [TextBox_30] : clear in color ,struggling in heat

## 2022-08-11 NOTE — DISCUSSION/SUMMARY
[FreeTextEntry1] : All images and report viewed by me in the office.\par 6MWT 7/13/2022: + Desat 91% -> 88% @ 3:30 on RA.\par PFTs 07/13/2022 Actual FEV1 0.87 (FEV1 Pred 39%), FEV1/FVC(%) - 76, %, TLC 84%, RV/%, DLCO 39% \par [place] 7/5/2022: FEV1 0.77 (35% pred), FEV1/FVC 74%\par Vitamin D level 5/3/2022: 37.7 \par Culture AFB 10/14/2020: No growth.\par Abd CT 8/28/2020 (lung views): + Extensive bronchiectasis seen at the bases. Predominantly posterior.

## 2022-08-11 NOTE — HISTORY OF PRESENT ILLNESS
[Never] : never [TextBox_4] : Patient returns on a f/u for bronchiectasis. Patient states the humidity has been bothering her lately but she has been able to walk 20 min every morning before it gets too hot for her. She also has a long air conditioned hallway that she walks down but she still feels weak. Spiriva 1.25 has helped her and she is not having as many coughing episodes as a result. She is bringing up clear phlegm still. She owns a vest but doesn't use it because the setting are currently too high for her to tolerate. She denies daytime dry mouth but does still get dry mouth at night. She reports hair thinning and her iron levels are low. Her appetite has continued to be poor which has been an issue for her for quite some time now. She reports having decreased to 98 lbs since her last visit.\par

## 2022-08-16 ENCOUNTER — TRANSCRIPTION ENCOUNTER (OUTPATIENT)
Age: 77
End: 2022-08-16

## 2022-08-17 ENCOUNTER — TRANSCRIPTION ENCOUNTER (OUTPATIENT)
Age: 77
End: 2022-08-17

## 2022-08-25 ENCOUNTER — APPOINTMENT (OUTPATIENT)
Dept: NEUROLOGY | Facility: CLINIC | Age: 77
End: 2022-08-25

## 2022-08-30 LAB — ACID FAST STN SPT: NORMAL

## 2022-09-13 ENCOUNTER — RX CHANGE (OUTPATIENT)
Age: 77
End: 2022-09-13

## 2022-09-13 ENCOUNTER — TRANSCRIPTION ENCOUNTER (OUTPATIENT)
Age: 77
End: 2022-09-13

## 2022-09-13 RX ORDER — UMECLIDINIUM 62.5 UG/1
62.5 AEROSOL, POWDER ORAL DAILY
Qty: 30 | Refills: 5 | Status: DISCONTINUED | COMMUNITY
Start: 2022-09-13 | End: 2022-09-13

## 2022-09-16 ENCOUNTER — TRANSCRIPTION ENCOUNTER (OUTPATIENT)
Age: 77
End: 2022-09-16

## 2022-09-26 ENCOUNTER — TRANSCRIPTION ENCOUNTER (OUTPATIENT)
Age: 77
End: 2022-09-26

## 2022-09-29 ENCOUNTER — APPOINTMENT (OUTPATIENT)
Dept: PULMONOLOGY | Facility: CLINIC | Age: 77
End: 2022-09-29

## 2022-09-29 DIAGNOSIS — R64 CACHEXIA: ICD-10-CM

## 2022-09-29 DIAGNOSIS — E61.1 IRON DEFICIENCY: ICD-10-CM

## 2022-09-29 DIAGNOSIS — R04.2 HEMOPTYSIS: ICD-10-CM

## 2022-09-29 DIAGNOSIS — R91.1 SOLITARY PULMONARY NODULE: ICD-10-CM

## 2022-09-29 DIAGNOSIS — J38.3 OTHER DISEASES OF VOCAL CORDS: ICD-10-CM

## 2022-09-29 DIAGNOSIS — I87.2 VENOUS INSUFFICIENCY (CHRONIC) (PERIPHERAL): ICD-10-CM

## 2022-09-29 PROCEDURE — 99211 OFF/OP EST MAY X REQ PHY/QHP: CPT | Mod: 95

## 2022-09-30 NOTE — HISTORY OF PRESENT ILLNESS
[Home] : at home, [unfilled] , at the time of the visit. [Medical Office: ([facility])___] : at the medical office located in  [Verbal consent obtained from patient] : the patient, [unfilled] [Never] : never [TextBox_4] : Patient returns on a telehealth f/u for bronchiectasis. Patient has been c/o back pain on her L side that has occurred for the last 2 months now. The pain has stayed the same over the 2- month duration and has gotten no better but no worse. She wants to know if the SmartVest is contributing to her back pain. Despite the back pain she does think the SmartVest is helping her. She is expectorating clear phlegm and denies hemoptysis. Her breathing in general has stayed the same and has not been much of a problem for her lately. She feels that Spiriva seems to be working well.\par \par Plan: She will decrease the vest pressure and the time on the vest until her back feels better. If she is still able to cough up phlegm, she could try skipping the vest altogether. She can use ice and Tylenol for the pain if necessary.\par

## 2022-09-30 NOTE — ASSESSMENT
[FreeTextEntry1] : above was discussed at length with the patient who has an excellent understanding of the issues. 7 min telehealth

## 2022-10-12 ENCOUNTER — APPOINTMENT (OUTPATIENT)
Dept: GASTROENTEROLOGY | Facility: CLINIC | Age: 77
End: 2022-10-12

## 2022-10-12 VITALS
BODY MASS INDEX: 16.66 KG/M2 | TEMPERATURE: 97.1 F | WEIGHT: 100 LBS | HEIGHT: 65 IN | DIASTOLIC BLOOD PRESSURE: 70 MMHG | HEART RATE: 67 BPM | OXYGEN SATURATION: 94 % | SYSTOLIC BLOOD PRESSURE: 100 MMHG

## 2022-10-12 PROCEDURE — 99203 OFFICE O/P NEW LOW 30 MIN: CPT

## 2022-10-12 NOTE — HISTORY OF PRESENT ILLNESS
[FreeTextEntry1] : Ms. RAMIRO ROLON is a 77 year old female with a PMH of bronchiectasis and Parkinson's. \par \par Presents for ED follow-up. \par Presented to [facility] for R shoulder pain and heartburn. \par Reports she's had right shoulder pain for 5 years but this time did not go away for 3 days. \par Heartburn started on Friday. \par She had nausea, but no vomiting. \par She had normal LFTs, normal lipase and normal gallbladder US (notable for gallbladder polyps). \par ACS ruled out.\par ED recommended follow-up with GI for a HIDA scan. \par Since discharge she has not had any shoulder pain or heartburn. \par

## 2022-10-12 NOTE — ASSESSMENT
[FreeTextEntry1] : Right shoulder pain and heartburn.\par - Right shoulder pain is chronic, unlikely this is referred pain. Pain has resolved. \par - Denies heartburn and has normal abdominal exam. \par - Discussed with Dr. Ashton, no need for further work-up with HIDA scan at this time. \par - Incidental gallbladder polyps, 4 and 5mm will need f/u US in 6 months. Order placed. \par - Patient instructed to call office if heartburn returns and we can evaluate further.

## 2022-10-14 ENCOUNTER — APPOINTMENT (OUTPATIENT)
Dept: VASCULAR SURGERY | Facility: CLINIC | Age: 77
End: 2022-10-14

## 2022-10-14 VITALS — HEART RATE: 75 BPM | DIASTOLIC BLOOD PRESSURE: 68 MMHG | OXYGEN SATURATION: 94 % | SYSTOLIC BLOOD PRESSURE: 104 MMHG

## 2022-10-14 PROCEDURE — 99203 OFFICE O/P NEW LOW 30 MIN: CPT

## 2022-10-19 ENCOUNTER — APPOINTMENT (OUTPATIENT)
Dept: NEUROLOGY | Facility: CLINIC | Age: 77
End: 2022-10-19

## 2022-11-15 DIAGNOSIS — R11.0 NAUSEA: ICD-10-CM

## 2022-11-15 RX ORDER — OMEPRAZOLE 20 MG/1
20 CAPSULE, DELAYED RELEASE ORAL
Qty: 30 | Refills: 3 | Status: ACTIVE | COMMUNITY
Start: 2022-11-15 | End: 1900-01-01

## 2022-11-29 NOTE — DATA REVIEWED
[FreeTextEntry1] : Chest CT scan reviewed personally by me\par \par Noncon CT\par 1.1 cm splenic artery aneurysm at the hilum with peripheral calcification

## 2022-11-29 NOTE — HISTORY OF PRESENT ILLNESS
[FreeTextEntry1] : 77-year-old female here for evaluation for an incidental finding of a 1.1 cm splenic artery aneurysm.\par She was undergoing a chest CT for history of bronchiectasis, and an incidental finding on the bottom portion of the CT was a 1.1 cm calcified splenic artery aneurysm located along the hilum.\par Prior to this, she denies a history of aneurysmal disease.  She does not have a personal family history of connective tissue disorder.  She denies abdominal pain or back pain or flank pain.

## 2022-11-29 NOTE — PHYSICAL EXAM
[Normal Breath Sounds] : Normal breath sounds [0] : right 0 [Alert] : alert [Oriented to Person] : oriented to person [Oriented to Place] : oriented to place [Calm] : calm [1+] : right 1+ [2+] : left 2+ [de-identified] : No acute distress [de-identified] : NCAT [de-identified] : Supple [de-identified] : Soft, nontender, nondistended

## 2022-11-29 NOTE — ASSESSMENT
[FreeTextEntry1] : 77-year-old female who was incidentally found to have a 1.1 cm splenic artery aneurysm on chest CT which is being performed for history of bronchiectasis.\par We discussed the natural history of visceral artery aneurysms, as well as indications for treatment, which includes aneurysm greater than 3 cm or symptomatic aneurysms.  She does not currently meet indications.\par Will obtain a duplex in 6-month span, and based on the size at that juncture, we will discuss the need for further surveillance as well as a surveillance frequency.  She is well aware of signs and symptoms of severe abdominal flank pain, she will seek emergency care should she have any such symptoms, although extremely unlikely at this size.

## 2022-12-01 ENCOUNTER — APPOINTMENT (OUTPATIENT)
Dept: GASTROENTEROLOGY | Facility: CLINIC | Age: 77
End: 2022-12-01

## 2022-12-01 PROCEDURE — 99441: CPT | Mod: 95

## 2022-12-01 NOTE — ASSESSMENT
[FreeTextEntry1] : F/u PRN.\par If nausea recurs, start omeprazole and let office know. \par Follow-up gallbladder US in March for polyps.

## 2022-12-01 NOTE — HISTORY OF PRESENT ILLNESS
[FreeTextEntry1] : RAMIRO ROLON presents for telephonic visit today Dec 01, 2022 (audio only). \par Patient is home at [address]\par \[place] [address].\par Author is in office at [address] [place] [address], [address], [place] [address]\par \par Telephonic follow-up for nausea. \par She did not start omeprazole, because her nausea subsided. \par No longer using/needing zofran. \par

## 2022-12-10 ENCOUNTER — NON-APPOINTMENT (OUTPATIENT)
Age: 77
End: 2022-12-10

## 2022-12-22 ENCOUNTER — APPOINTMENT (OUTPATIENT)
Dept: PULMONOLOGY | Facility: CLINIC | Age: 77
End: 2022-12-22

## 2022-12-22 VITALS
OXYGEN SATURATION: 94 % | HEIGHT: 65 IN | TEMPERATURE: 97.3 F | SYSTOLIC BLOOD PRESSURE: 100 MMHG | BODY MASS INDEX: 16.66 KG/M2 | HEART RATE: 74 BPM | DIASTOLIC BLOOD PRESSURE: 66 MMHG | WEIGHT: 100 LBS

## 2022-12-22 PROCEDURE — 99215 OFFICE O/P EST HI 40 MIN: CPT

## 2022-12-22 NOTE — HISTORY OF PRESENT ILLNESS
[TextBox_4] : 77 year old female with bronchiectasis came for f/u.\par She was seen by Dr Boothe\par Last CT chest seen, my read: unchanged bronchiectasis especially in the RtML and lingula with tree in bud nodules, unchanged from previous\par She was prescribed vest therapy.\par On Incruse\par AFB sputum negative\par She was treated for CHRISTOPH in 2018\par PFT's my read: severe airways obstruction with air trapping and severe decrease DLCO\par 6 MWT desaturations to 87%\par \par She is on vest therapy once a day. Cough is controlled. No exacerbations since the last visit. Did not use NS nebs. No exacerbations in a long time.\par Cough is minimal with scant sputum. No hemoptysis.\par Dyspnea is the same. Able to walk 1 mile every day.\par She did not go for pulmonary rehab.\par

## 2022-12-22 NOTE — PHYSICAL EXAM
[No Acute Distress] : no acute distress [Normal Appearance] : normal appearance [Normal Rate/Rhythm] : normal rate/rhythm [Normal S1, S2] : normal s1, s2 [No Resp Distress] : no resp distress [Rhonchi] : rhonchi [Kyphosis] : kyphosis [Normal Gait] : normal gait [No Cyanosis] : no cyanosis [No Focal Deficits] : no focal deficits [Oriented x3] : oriented x3 [Normal Affect] : normal affect

## 2022-12-23 RX ORDER — UMECLIDINIUM 62.5 UG/1
62.5 AEROSOL, POWDER ORAL
Qty: 30 | Refills: 4 | Status: DISCONTINUED | COMMUNITY
Start: 2022-09-13 | End: 2022-12-23

## 2023-01-08 RX ORDER — TIOTROPIUM BROMIDE INHALATION SPRAY 3.12 UG/1
2.5 SPRAY, METERED RESPIRATORY (INHALATION)
Qty: 1 | Refills: 5 | Status: ACTIVE | COMMUNITY
Start: 2022-08-11 | End: 1900-01-01

## 2023-03-07 ENCOUNTER — RESULT REVIEW (OUTPATIENT)
Age: 78
End: 2023-03-07

## 2023-03-09 ENCOUNTER — NON-APPOINTMENT (OUTPATIENT)
Age: 78
End: 2023-03-09

## 2023-03-17 ENCOUNTER — APPOINTMENT (OUTPATIENT)
Dept: VASCULAR SURGERY | Facility: CLINIC | Age: 78
End: 2023-03-17
Payer: MEDICARE

## 2023-03-17 PROCEDURE — 93976 VASCULAR STUDY: CPT

## 2023-04-05 ENCOUNTER — APPOINTMENT (OUTPATIENT)
Dept: PULMONOLOGY | Facility: CLINIC | Age: 78
End: 2023-04-05
Payer: MEDICARE

## 2023-04-05 VITALS
BODY MASS INDEX: 17.16 KG/M2 | RESPIRATION RATE: 12 BRPM | HEIGHT: 65 IN | SYSTOLIC BLOOD PRESSURE: 122 MMHG | HEART RATE: 74 BPM | DIASTOLIC BLOOD PRESSURE: 78 MMHG | TEMPERATURE: 97.2 F | OXYGEN SATURATION: 93 % | WEIGHT: 103 LBS

## 2023-04-05 PROCEDURE — 99204 OFFICE O/P NEW MOD 45 MIN: CPT

## 2023-04-10 LAB — BACTERIA SPT CULT: ABNORMAL

## 2023-04-10 NOTE — DISCUSSION/SUMMARY
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 Russell for further management.\par \par Review:\par Clinic visit (Pulmonary from past)\par \par Radiology:\par - CT chest (7/22): Similar to the prior study there is lingular and right middle lobe bronchiectasis with associated chronic atelectasis. There is mild cylindrical bronchiectasis in both lower lobes. There are \par numerous tree-in-bud nodules and scattered right and left lower lobe pulmonary nodules measuring up \par to 0.8 cm. A few calcified pulmonary nodules in the left lower lobe. \par \par Labs (done on 11/21):\par - RF, alpha 1 antitrypsin, ANCA aspergillus antibodies, CCP ab, Sjogren syndrome (Negative)\par - Immunoglobulin levels, protein electrophoresis (non contributory)\par - Eosinophil levels (11/21): 870\par - IGE level: 108\par \par Culture data:\par - AFB (4/23): Negative\par - AFB (7/2022): Mycobacterium Gordonae (only one time)\par - AFB (10/202): Mycobacterium avium complex\par - Bacteria (4/23): Pseudomonas (pan-sensitive)\par - Fungus (4/23): Negative\par \par Lung function and 6 min walk test:\par - PFT (7/ 22): FEV1 39, FEV1/FVC 77 , TLC 84 , DLCO 39  \par - 6 minute walk test (7/22):  91% on RA, 88% on RA with exertion\par \par A/P\par Bronchiectasis with Mycobacterium Avium complex:\par - Denies history of frequent childhood infection\par - No exposure or work history suggestive of etiology of bronchiectasis.\par - Denies GERD symptoms\par - MAC treatment was done in 2018. Last MAC positive in Oct 2020. \par - Mycobacterium Gordonae positive in July 2020. Negative AFB (done in April 2023)\par - Pseudomonas in sputum (April 2023, pan sensitive).\par Plan:\par - CT chest ordered today. \par - Add hypertonic saline. Plan for hypertonic saline followed by VEST therapy 2-3 times a day\par - Continue Spiriva. May consider to change to LABA/LAMA combination in future.\par - Patient is hypoxic with ambulation but she does not want to use oxygen with ambulation.  \par - Follow up (Televisit) after CT chest\par - Patient will need work up for bronchiectasis and repeat PFT (may get in Dr. Russell's office, I will order after CT chest)\par - Pseudomonas colonizer, no indication for treatment at present. \par

## 2023-04-10 NOTE — REVIEW OF SYSTEMS
[Cough] : cough [Fever] : no fever [Chills] : no chills [Dry Eyes] : no dry eyes [Eye Irritation] : no eye irritation [Sputum] : no sputum [Dyspnea] : no dyspnea [A.M. Dry Mouth] : no a.m. dry mouth [SOB on Exertion] : no sob on exertion [Chest Discomfort] : no chest discomfort [Orthopnea] : no orthopnea [Hay Fever] : no hay fever [Nasal Discharge] : no nasal discharge [GERD] : no gerd [Diarrhea] : no diarrhea

## 2023-04-10 NOTE — PHYSICAL EXAM
[No Acute Distress] : no acute distress [Normal Oropharynx] : normal oropharynx [Normal Appearance] : normal appearance [Normal Rate/Rhythm] : normal rate/rhythm [Normal S1, S2] : normal s1, s2 [No Resp Distress] : no resp distress [Clear to Auscultation Bilaterally] : clear to auscultation bilaterally [Benign] : benign [Oriented x3] : oriented x3 [Not Tender] : not tender [Normal Affect] : normal affect [TextBox_2] : Thin built [TextBox_105] : clubbing

## 2023-04-10 NOTE — HISTORY OF PRESENT ILLNESS
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 Russell for further management. Patient denies exacerbation secondary to bronchiectasis in last 3 years. Patient c/o occasional cough with minimal expectoration. She uses VEST intermittently. Denies exposure to fumes at work, pets at home, history of recurrent pneumonia in the past. Denies weight loss, fever, hemoptysis or chest pain. \par

## 2023-04-10 NOTE — CONSULT LETTER
[Dear  ___] : Dear  [unfilled], [Consult Letter:] : I had the pleasure of evaluating your patient, [unfilled]. [Please see my note below.] : Please see my note below. [Consult Closing:] : Thank you very much for allowing me to participate in the care of this patient.  If you have any questions, please do not hesitate to contact me. [FreeTextEntry3] : Sincerely\par \par Edward Batista MD [facility]\par , [employer]\par Associate , Pulmonary and Critical Care Fellowship\par Pulmonary and Critical Care\par [facility]\par Phone: [phone]\par

## 2023-04-14 ENCOUNTER — APPOINTMENT (OUTPATIENT)
Dept: VASCULAR SURGERY | Facility: CLINIC | Age: 78
End: 2023-04-14
Payer: MEDICARE

## 2023-04-14 VITALS — BODY MASS INDEX: 17.49 KG/M2 | WEIGHT: 105 LBS | HEIGHT: 65 IN

## 2023-04-14 PROCEDURE — 99213 OFFICE O/P EST LOW 20 MIN: CPT

## 2023-04-14 NOTE — DATA REVIEWED
[FreeTextEntry1] : Abdominal duplex performed at the [facility] vascular lab at [place] office\par \par 1.1 cm splenic artery aneurysm at the hilum

## 2023-04-14 NOTE — ASSESSMENT
[FreeTextEntry1] : 77-year-old female who was incidentally found to have a 1.1 cm splenic artery aneurysm on chest CT which is being performed for history of bronchiectasis.\par After this initial finding, we did obtain a 6-month follow-up duplex which again revealed a 1.1 cm splenic artery aneurysm, for which she remains fully asymptomatic.\par We again discussed the natural history of visceral artery aneurysms, as well as indications for treatment, which includes aneurysm greater than 3 cm or symptomatic aneurysms.  She does not currently meet indications.\par We will continue surveillance, at this point at 1 year.  With continued stability of sizes, we will discuss surveillance is needed any further or if it can be biyearly.\par She will follow-up in 1 year after duplex performed.

## 2023-04-14 NOTE — PHYSICAL EXAM
[Normal Breath Sounds] : Normal breath sounds [0] : right 0 [1+] : right 1+ [2+] : left 2+ [Alert] : alert [Oriented to Person] : oriented to person [Oriented to Place] : oriented to place [Calm] : calm [de-identified] : No acute distress [de-identified] : NCAT [de-identified] : Supple [de-identified] : Soft, nontender, nondistended

## 2023-04-14 NOTE — HISTORY OF PRESENT ILLNESS
[FreeTextEntry1] : 77-year-old female here for evaluation for an incidental finding of a 1.1 cm splenic artery aneurysm.\par She was undergoing a chest CT for history of bronchiectasis, and an incidental finding on the bottom portion of the CT was a 1.1 cm calcified splenic artery aneurysm located along the hilum.\par Prior to this, she denies a history of aneurysmal disease.  She does not have a personal family history of connective tissue disorder.  She denies abdominal pain or back pain or flank pain. [de-identified] : 4/14/23 - She continues to do well.  Denies abdominal pain, back pain, flank pain.  Underwent abdominal duplex, here to review.

## 2023-04-27 ENCOUNTER — APPOINTMENT (OUTPATIENT)
Dept: UROLOGY | Facility: CLINIC | Age: 78
End: 2023-04-27
Payer: MEDICARE

## 2023-04-27 VITALS — DIASTOLIC BLOOD PRESSURE: 85 MMHG | HEART RATE: 67 BPM | SYSTOLIC BLOOD PRESSURE: 128 MMHG

## 2023-04-27 PROCEDURE — 99203 OFFICE O/P NEW LOW 30 MIN: CPT

## 2023-04-27 NOTE — ASSESSMENT
[FreeTextEntry1] : Patient is a 78 year female who presents for evaluation of complex left renal cyst.  She has a abdominal US in 3/23 for evaluation of gallbladder polyp and a 2 cm complex left renal cyst was noted.  \par no associated pain.  no associated  symptoms.  no modifying factors.\par No prior  history.\par \par A/P\par 1. complex left renal cyst\par MRI of abdomen with and without renal protocol\par will call with results

## 2023-05-08 ENCOUNTER — APPOINTMENT (OUTPATIENT)
Dept: PULMONOLOGY | Facility: CLINIC | Age: 78
End: 2023-05-08
Payer: MEDICARE

## 2023-05-08 PROCEDURE — 99443: CPT | Mod: 95

## 2023-05-08 NOTE — CONSULT LETTER
[Dear  ___] : Dear  [unfilled], [Consult Letter:] : I had the pleasure of evaluating your patient, [unfilled]. [Please see my note below.] : Please see my note below. [Consult Closing:] : Thank you very much for allowing me to participate in the care of this patient.  If you have any questions, please do not hesitate to contact me. [FreeTextEntry3] : Sincerely\par \par Edward Batista MD [facility]\par , [employer]\par Associate , Pulmonary and Critical Care Fellowship\par Pulmonary and Critical Care\par [facility]\par Phone: [phone]\par

## 2023-05-08 NOTE — DISCUSSION/SUMMARY
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 Russell for further management.\par \par Review:\par Clinic visit (Pulmonary from past)\par \par Radiology:\par - CT chest (5/23): Bilateral RML and Lingular bronchiectasis. No change as compared to last CT chets done in July 2022\par \par - CT chest (7/22): Similar to the prior study there is lingular and right middle lobe bronchiectasis with associated chronic atelectasis. There is mild cylindrical bronchiectasis in both lower lobes. There are \par numerous tree-in-bud nodules and scattered right and left lower lobe pulmonary nodules measuring up \par to 0.8 cm. A few calcified pulmonary nodules in the left lower lobe. \par \par Labs (done on 11/21):\par - RF, UZIEL, Sjogren syndrome (Negative)\par - Immunoglobulin levels, protein electrophoresis, Alpha a anti trypsin, CCP level, ANCA (Pending)\par - Eosinophil levels (7/24): normal\par - IGE level (7/22): 98 (normal)\par \par Culture data:\par - AFB (4/23): Negative\par - AFB (7/2022): Mycobacterium Gordonae (only one time)\par - AFB (10/202): Mycobacterium avium complex\par - Bacteria (4/23): Pseudomonas (pan-sensitive)\par - Fungus (4/23): Negative\par \par Lung function and 6 min walk test:\par - PFT (7/ 22): FEV1 39, FEV1/FVC 77 , TLC 84 , DLCO 39  \par - 6 minute walk test (7/22):  91% on RA, 88% on RA with exertion\par \par A/P\par Bronchiectasis with Mycobacterium Avium complex:\par - Denies history of frequent childhood infection\par - No exposure or work history suggestive of etiology of bronchiectasis.\par - Denies GERD symptoms\par - MAC treatment was done in 2018. Last MAC positive in Oct 2020. \par - Mycobacterium Gordonae positive in July 2020. Negative AFB (done in April 2023)\par - Pseudomonas in sputum (April 2023, pan sensitive).\par Plan:\par - CT chest done in April 2023: Stable\par - Continue hypertonic saline followed by VEST therapy 2-3 times a day\par - Continue Spiriva.\par - Patient is hypoxic with ambulation but she does not want to use oxygen with ambulation.  \par - Patient will need work up for bronchiectasis and repeat PFT during next visit\par - Pseudomonas colonizer, no indication for treatment at present. \par - Follow up in 6 months\par

## 2023-05-08 NOTE — HISTORY OF PRESENT ILLNESS
[Never] : never [Home] : at home, [unfilled] , at the time of the visit. 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 Russell for further management. Patient denies exacerbation secondary to bronchiectasis in last 3 years. Patient c/o occasional cough with minimal expectoration. She uses VEST intermittently. Denies exposure to fumes at work, pets at home, history of recurrent pneumonia in the past. Denies weight loss, fever, hemoptysis or chest pain. \par \par 5/8/23:\par No exacerbation since last visit. Brings up minimal phlegm with use of hypertonic saline and VEST. CT chest was done.

## 2023-05-11 LAB — FUNGUS SPT CULT: NORMAL

## 2023-05-22 ENCOUNTER — RESULT REVIEW (OUTPATIENT)
Age: 78
End: 2023-05-22

## 2023-05-25 ENCOUNTER — NON-APPOINTMENT (OUTPATIENT)
Age: 78
End: 2023-05-25

## 2023-05-25 LAB — ACID FAST STN SPT: NORMAL

## 2023-05-26 ENCOUNTER — APPOINTMENT (OUTPATIENT)
Dept: UROLOGY | Facility: CLINIC | Age: 78
End: 2023-05-26
Payer: MEDICARE

## 2023-05-26 DIAGNOSIS — N28.1 CYST OF KIDNEY, ACQUIRED: ICD-10-CM

## 2023-05-26 PROCEDURE — 99442: CPT | Mod: 95

## 2023-08-09 ENCOUNTER — APPOINTMENT (OUTPATIENT)
Dept: PULMONOLOGY | Facility: CLINIC | Age: 78
End: 2023-08-09
Payer: MEDICARE

## 2023-08-09 VITALS
WEIGHT: 105 LBS | BODY MASS INDEX: 17.49 KG/M2 | DIASTOLIC BLOOD PRESSURE: 70 MMHG | SYSTOLIC BLOOD PRESSURE: 116 MMHG | HEART RATE: 70 BPM | OXYGEN SATURATION: 92 % | TEMPERATURE: 97.2 F | HEIGHT: 65 IN

## 2023-08-09 PROCEDURE — 99214 OFFICE O/P EST MOD 30 MIN: CPT

## 2023-08-09 NOTE — PHYSICAL EXAM
[No Acute Distress] : no acute distress [Normal Appearance] : normal appearance [Normal Rate/Rhythm] : normal rate/rhythm [No Resp Distress] : no resp distress [Clear to Auscultation Bilaterally] : clear to auscultation bilaterally [Normal Gait] : normal gait [No Edema] : no edema [No Focal Deficits] : no focal deficits [Oriented x3] : oriented x3 [Normal Affect] : normal affect

## 2023-08-09 NOTE — HISTORY OF PRESENT ILLNESS
[TextBox_4] : 78 year old female with bronchiectasis, previous CHRISTOPH infection came for f/u. She is on vest therapy, NS nebs and Spiriva. Last CT chest seen, my read: unchanged bronchiectasis with multiple tree in bud nodules Lately she started to cough more with clear sputum. No hemoptysis. No fever, chills. Dyspnea is at baseline No hospitalizations. She saw Dr Batista at [facility], notes reviewed Last labs: CHRISTOPH negative, sputum CX few Pseudomonas

## 2023-08-14 ENCOUNTER — LABORATORY RESULT (OUTPATIENT)
Age: 78
End: 2023-08-14

## 2023-09-12 ENCOUNTER — TRANSCRIPTION ENCOUNTER (OUTPATIENT)
Age: 78
End: 2023-09-12

## 2023-09-20 ENCOUNTER — TRANSCRIPTION ENCOUNTER (OUTPATIENT)
Age: 78
End: 2023-09-20

## 2023-09-25 ENCOUNTER — TRANSCRIPTION ENCOUNTER (OUTPATIENT)
Age: 78
End: 2023-09-25

## 2023-10-16 ENCOUNTER — APPOINTMENT (OUTPATIENT)
Dept: PULMONOLOGY | Facility: CLINIC | Age: 78
End: 2023-10-16
Payer: MEDICARE

## 2023-10-16 VITALS
BODY MASS INDEX: 17.49 KG/M2 | TEMPERATURE: 97 F | OXYGEN SATURATION: 97 % | HEART RATE: 77 BPM | WEIGHT: 105 LBS | DIASTOLIC BLOOD PRESSURE: 62 MMHG | HEIGHT: 65 IN | SYSTOLIC BLOOD PRESSURE: 110 MMHG

## 2023-10-16 PROCEDURE — 99214 OFFICE O/P EST MOD 30 MIN: CPT

## 2023-11-15 ENCOUNTER — APPOINTMENT (OUTPATIENT)
Dept: PULMONOLOGY | Facility: CLINIC | Age: 78
End: 2023-11-15

## 2023-11-15 ENCOUNTER — TRANSCRIPTION ENCOUNTER (OUTPATIENT)
Age: 78
End: 2023-11-15

## 2023-11-16 ENCOUNTER — TRANSCRIPTION ENCOUNTER (OUTPATIENT)
Age: 78
End: 2023-11-16

## 2023-11-17 ENCOUNTER — APPOINTMENT (OUTPATIENT)
Dept: PULMONOLOGY | Facility: CLINIC | Age: 78
End: 2023-11-17
Payer: MEDICARE

## 2023-11-17 DIAGNOSIS — A31.0 PULMONARY MYCOBACTERIAL INFECTION: ICD-10-CM

## 2023-11-17 PROCEDURE — 99443: CPT | Mod: 95

## 2023-11-21 ENCOUNTER — TRANSCRIPTION ENCOUNTER (OUTPATIENT)
Age: 78
End: 2023-11-21

## 2023-11-22 ENCOUNTER — TRANSCRIPTION ENCOUNTER (OUTPATIENT)
Age: 78
End: 2023-11-22

## 2023-12-04 ENCOUNTER — TRANSCRIPTION ENCOUNTER (OUTPATIENT)
Age: 78
End: 2023-12-04

## 2023-12-05 ENCOUNTER — TRANSCRIPTION ENCOUNTER (OUTPATIENT)
Age: 78
End: 2023-12-05

## 2023-12-28 ENCOUNTER — TRANSCRIPTION ENCOUNTER (OUTPATIENT)
Age: 78
End: 2023-12-28

## 2024-01-02 ENCOUNTER — TRANSCRIPTION ENCOUNTER (OUTPATIENT)
Age: 79
End: 2024-01-02

## 2024-01-03 ENCOUNTER — NON-APPOINTMENT (OUTPATIENT)
Age: 79
End: 2024-01-03

## 2024-01-04 ENCOUNTER — APPOINTMENT (OUTPATIENT)
Dept: ENDOCRINOLOGY | Facility: CLINIC | Age: 79
End: 2024-01-04
Payer: MEDICARE

## 2024-01-04 VITALS
HEIGHT: 65 IN | HEART RATE: 65 BPM | WEIGHT: 99 LBS | DIASTOLIC BLOOD PRESSURE: 70 MMHG | OXYGEN SATURATION: 95 % | BODY MASS INDEX: 16.5 KG/M2 | SYSTOLIC BLOOD PRESSURE: 103 MMHG

## 2024-01-04 DIAGNOSIS — K82.4 CHOLESTEROLOSIS OF GALLBLADDER: ICD-10-CM

## 2024-01-04 DIAGNOSIS — M81.0 AGE-RELATED OSTEOPOROSIS W/OUT CURRENT PATHOLOGICAL FRACTURE: ICD-10-CM

## 2024-01-04 PROCEDURE — 99204 OFFICE O/P NEW MOD 45 MIN: CPT

## 2024-01-04 RX ORDER — CHOLECALCIFEROL (VITAMIN D3) 25 MCG
TABLET ORAL
Refills: 0 | Status: ACTIVE | COMMUNITY

## 2024-01-04 RX ORDER — BIOTIN 10 MG
10000 TABLET ORAL
Refills: 0 | Status: ACTIVE | COMMUNITY

## 2024-01-04 RX ORDER — ASPIRIN 81 MG
600-200 TABLET, DELAYED RELEASE (ENTERIC COATED) ORAL
Refills: 0 | Status: ACTIVE | COMMUNITY

## 2024-01-04 RX ORDER — ZOLEDRONIC ACID 5 MG/100ML
5 INJECTION, SOLUTION INTRAVENOUS
Refills: 0 | Status: ACTIVE | COMMUNITY

## 2024-01-04 RX ORDER — LEVOTHYROXINE SODIUM 0.03 MG/1
25 TABLET ORAL
Qty: 90 | Refills: 0 | Status: DISCONTINUED | COMMUNITY
Start: 2017-06-05 | End: 2024-01-04

## 2024-01-04 NOTE — PHYSICAL EXAM
[Alert] : alert [Well Nourished] : well nourished [No Acute Distress] : no acute distress [Well Developed] : well developed [Normal Voice/Communication] : normal voice communication [Normal Sclera/Conjunctiva] : normal sclera/conjunctiva [EOMI] : extra ocular movement intact [Normal Hearing] : hearing was normal [No Neck Mass] : no neck mass was observed [No Respiratory Distress] : no respiratory distress [No Accessory Muscle Use] : no accessory muscle use [Normal Rate and Effort] : normal respiratory rate and effort [Normal Rate] : heart rate was normal [Regular Rhythm] : with a regular rhythm [No Spinal Tenderness] : no spinal tenderness [Kyphosis] : kyphosis present [Normal Gait] : normal gait [No Motor Deficits] : the motor exam was normal [Oriented x3] : oriented to person, place, and time [Normal Affect] : the affect was normal [Recent Memory Normal] : recent memory was not impaired [Normal Insight/Judgement] : insight and judgment were intact [Normal Mood] : the mood was normal [Remote Memory Normal] : remote memory was not impaired

## 2024-01-04 NOTE — REASON FOR VISIT
[Initial Evaluation] : an initial evaluation [Osteoporosis] : osteoporosis [Source: ______] : History obtained from [facility] [FreeTextEntry2] : Dr. Mclean

## 2024-01-04 NOTE — HISTORY OF PRESENT ILLNESS
[FreeTextEntry1] : Patient presents for initial evaluation to establish care after her endocrinologist, Dr. Valle, retired.  Osteoporosis diagnosed by DEXA scan approximately 4 years ago. Last DEXA scan: June 2023; worst T-score -3.2 in the spine, -3.0 in the femoral neck. History of fractures: Denies Age at menopause: 52 years old Height loss: Denies History of hypercalcemia: Denies History of kidney stones: Denies Problems with balance or vision: Denies History of falls: Denies  Smoking history: Denies Prior glucocorticoid use: Denies Prior anti-epileptic medications: Denies Prior chemotherapy: Denies Prior radiation therapy: Yes, breast cancer. Heartburn or reflux symptoms: Yes Family history of fractures, osteoporosis, or hyperparathyroidism: Mother; osteoporosis Dentist following: Yes.  No implants or extractions required. Calcium and Vitamin D through diet and supplementation: Yes.  Calcium 600 mg and vitamin D 200 units daily Previous osteoporosis treatments: Briefly alendronate.  Received first dose of Reclast in January 2022.  Next dose is due this month.

## 2024-01-18 ENCOUNTER — LABORATORY RESULT (OUTPATIENT)
Age: 79
End: 2024-01-18

## 2024-01-19 ENCOUNTER — APPOINTMENT (OUTPATIENT)
Dept: PULMONOLOGY | Facility: CLINIC | Age: 79
End: 2024-01-19

## 2024-01-23 ENCOUNTER — APPOINTMENT (OUTPATIENT)
Dept: ENDOCRINOLOGY | Facility: CLINIC | Age: 79
End: 2024-01-23

## 2024-02-06 ENCOUNTER — RESULT REVIEW (OUTPATIENT)
Age: 79
End: 2024-02-06

## 2024-02-14 ENCOUNTER — TRANSCRIPTION ENCOUNTER (OUTPATIENT)
Age: 79
End: 2024-02-14

## 2024-02-28 ENCOUNTER — APPOINTMENT (OUTPATIENT)
Dept: PULMONOLOGY | Facility: CLINIC | Age: 79
End: 2024-02-28
Payer: MEDICARE

## 2024-02-28 VITALS
SYSTOLIC BLOOD PRESSURE: 116 MMHG | DIASTOLIC BLOOD PRESSURE: 63 MMHG | BODY MASS INDEX: 16.5 KG/M2 | OXYGEN SATURATION: 97 % | WEIGHT: 99 LBS | TEMPERATURE: 97 F | HEIGHT: 65 IN | HEART RATE: 69 BPM

## 2024-02-28 DIAGNOSIS — J96.11 CHRONIC RESPIRATORY FAILURE WITH HYPOXIA: ICD-10-CM

## 2024-02-28 DIAGNOSIS — J47.9 BRONCHIECTASIS, UNCOMPLICATED: ICD-10-CM

## 2024-02-28 PROCEDURE — 99214 OFFICE O/P EST MOD 30 MIN: CPT

## 2024-02-28 RX ORDER — TIOTROPIUM BROMIDE AND OLODATEROL 3.124; 2.736 UG/1; UG/1
2.5-2.5 SPRAY, METERED RESPIRATORY (INHALATION)
Qty: 4 | Refills: 5 | Status: ACTIVE | COMMUNITY
Start: 2024-02-28 | End: 1900-01-01

## 2024-02-28 NOTE — PHYSICAL EXAM
[No Acute Distress] : no acute distress [Normal Appearance] : normal appearance [Normal Rate/Rhythm] : normal rate/rhythm [Normal S1, S2] : normal s1, s2 [Rhonchi] : rhonchi [No Resp Distress] : no resp distress [Kyphosis] : kyphosis [No Cyanosis] : no cyanosis [No Focal Deficits] : no focal deficits [Oriented x3] : oriented x3 [Normal Affect] : normal affect

## 2024-02-28 NOTE — HISTORY OF PRESENT ILLNESS
[TextBox_4] : 78 year old female with bronchiectasis, chronic respiratory failure came for f/u. Last CT in 04/23. Last PFT's 09/23 She is on Anoro and VEST therapy  Feels the same. Has dyspnea on exertion and has cough at times. No hemoptysis. No hospitalizations since the last visit Uses mucus clearance techniques. Does not use O2 much She would like to have another CT chest

## 2024-03-15 RX ORDER — UMECLIDINIUM BROMIDE AND VILANTEROL TRIFENATATE 62.5; 25 UG/1; UG/1
62.5-25 POWDER RESPIRATORY (INHALATION)
Qty: 1 | Refills: 3 | Status: ACTIVE | COMMUNITY
Start: 2023-08-09 | End: 1900-01-01

## 2024-03-19 ENCOUNTER — APPOINTMENT (OUTPATIENT)
Dept: VASCULAR SURGERY | Facility: CLINIC | Age: 79
End: 2024-03-19
Payer: MEDICARE

## 2024-03-19 PROCEDURE — 93976 VASCULAR STUDY: CPT

## 2024-04-03 ENCOUNTER — APPOINTMENT (OUTPATIENT)
Dept: NEUROLOGY | Facility: CLINIC | Age: 79
End: 2024-04-03

## 2024-04-09 ENCOUNTER — RESULT REVIEW (OUTPATIENT)
Age: 79
End: 2024-04-09

## 2024-04-12 ENCOUNTER — APPOINTMENT (OUTPATIENT)
Dept: VASCULAR SURGERY | Facility: CLINIC | Age: 79
End: 2024-04-12
Payer: MEDICARE

## 2024-04-12 DIAGNOSIS — I72.8 ANEURYSM OF OTHER SPECIFIED ARTERIES: ICD-10-CM

## 2024-04-12 PROCEDURE — 99213 OFFICE O/P EST LOW 20 MIN: CPT

## 2024-04-12 NOTE — DATA REVIEWED
[FreeTextEntry1] : Abdominal duplex performed at the [facility] vascular lab at [place] office  1.2 cm splenic artery aneurysm at the hilum

## 2024-04-12 NOTE — PHYSICAL EXAM
[de-identified] : Physical exam cannot be completed due to the nature of the visit.  The patient is in no distress, she answers questions appropriately.

## 2024-04-12 NOTE — ASSESSMENT
[FreeTextEntry1] : 79-year-old female who was incidentally found to have a 1.1 cm splenic artery aneurysm on chest CT which is being performed for history of bronchiectasis. After this initial finding, we did obtain a 6-month follow-up duplex which again revealed a 1.1 cm splenic artery aneurysm, for which she remains fully asymptomatic. We again discussed the natural history of visceral artery aneurysms, as well as indications for treatment, which includes aneurysm greater than 3 cm or symptomatic aneurysms.  She does not currently meet indications. On a 1 year surveillance, splenic artery aneurysm is essentially stable, 1.2 centimeters, clearly visualized on ultrasound.  I discussed with her these reassuring findings, as well as the fact that we do not need to surveilled this with great frequency.  She understands however wants to continue to keep track.  We will repeat sonogram in 2 years after which she will follow-up with me. She is well aware of associated symptoms, she will seek emergency care if she has any such symptoms.

## 2024-04-12 NOTE — HISTORY OF PRESENT ILLNESS
[FreeTextEntry1] : 77-year-old female here for evaluation for an incidental finding of a 1.1 cm splenic artery aneurysm.\par  She was undergoing a chest CT for history of bronchiectasis, and an incidental finding on the bottom portion of the CT was a 1.1 cm calcified splenic artery aneurysm located along the hilum.\par  Prior to this, she denies a history of aneurysmal disease.  She does not have a personal family history of connective tissue disorder.  She denies abdominal pain or back pain or flank pain. [de-identified] : 4/14/23 - She continues to do well.  Denies abdominal pain, back pain, flank pain.  Underwent abdominal duplex, here to review.  4/12/24 - She denies abdominal pain, back pain.  She did undergo abdominal duplex.  In regards to her splenic artery aneurysm, all is well with no onset of symptoms.  However, she does report that her Parkinson's is progressing.

## 2024-04-12 NOTE — REASON FOR VISIT
[Home] : at home, [unfilled] , at the time of the visit. [Medical Office: ([facility])___] : at the medical office located in  [Patient] : the patient [This encounter was initiated by telehealth (audio with video) and converted to telephone (audio only) due to technical difficulties.] : This encounter was initiated by telehealth (audio with video) and converted to telephone (audio only) due to technical difficulties. [Follow-Up: _____] : a [unfilled] follow-up visit

## 2024-04-23 ENCOUNTER — NON-APPOINTMENT (OUTPATIENT)
Age: 79
End: 2024-04-23

## 2024-04-25 ENCOUNTER — NON-APPOINTMENT (OUTPATIENT)
Age: 79
End: 2024-04-25

## 2024-05-03 RX ORDER — SODIUM CHLORIDE FOR INHALATION 3 %
3 VIAL, NEBULIZER (ML) INHALATION
Qty: 240 | Refills: 5 | Status: ACTIVE | COMMUNITY
Start: 2023-04-05 | End: 1900-01-01

## 2024-05-15 ENCOUNTER — APPOINTMENT (OUTPATIENT)
Dept: SURGERY | Facility: CLINIC | Age: 79
End: 2024-05-15

## 2024-07-04 ENCOUNTER — NON-APPOINTMENT (OUTPATIENT)
Age: 79
End: 2024-07-04

## 2024-07-08 ENCOUNTER — APPOINTMENT (OUTPATIENT)
Dept: ENDOCRINOLOGY | Facility: CLINIC | Age: 79
End: 2024-07-08
Payer: MEDICARE

## 2024-07-08 VITALS
SYSTOLIC BLOOD PRESSURE: 124 MMHG | BODY MASS INDEX: 15.83 KG/M2 | HEIGHT: 65 IN | WEIGHT: 95 LBS | DIASTOLIC BLOOD PRESSURE: 81 MMHG | HEART RATE: 71 BPM | OXYGEN SATURATION: 94 %

## 2024-07-08 DIAGNOSIS — M81.0 AGE-RELATED OSTEOPOROSIS W/OUT CURRENT PATHOLOGICAL FRACTURE: ICD-10-CM

## 2024-07-08 PROCEDURE — G2211 COMPLEX E/M VISIT ADD ON: CPT

## 2024-07-08 PROCEDURE — 99214 OFFICE O/P EST MOD 30 MIN: CPT

## 2024-07-08 RX ORDER — TURMERIC ROOT EXTRACT 500 MG
TABLET ORAL
Refills: 0 | Status: ACTIVE | COMMUNITY

## 2024-07-10 LAB
T3 SERPL-MCNC: 82 NG/DL
T4 FREE SERPL-MCNC: 1.3 NG/DL
TSH SERPL-ACNC: 4.13 UIU/ML

## 2024-08-28 ENCOUNTER — RX RENEWAL (OUTPATIENT)
Age: 79
End: 2024-08-28

## 2024-09-23 ENCOUNTER — APPOINTMENT (OUTPATIENT)
Dept: UROLOGY | Facility: CLINIC | Age: 79
End: 2024-09-23
Payer: MEDICARE

## 2024-09-23 VITALS
HEART RATE: 91 BPM | HEIGHT: 65 IN | DIASTOLIC BLOOD PRESSURE: 84 MMHG | SYSTOLIC BLOOD PRESSURE: 132 MMHG | WEIGHT: 95 LBS | BODY MASS INDEX: 15.83 KG/M2

## 2024-09-23 DIAGNOSIS — R35.1 NOCTURIA: ICD-10-CM

## 2024-09-23 DIAGNOSIS — N28.1 CYST OF KIDNEY, ACQUIRED: ICD-10-CM

## 2024-09-23 DIAGNOSIS — Z86.69 PERSONAL HISTORY OF OTHER DISEASES OF THE NERVOUS SYSTEM AND SENSE ORGANS: ICD-10-CM

## 2024-09-23 PROCEDURE — 99214 OFFICE O/P EST MOD 30 MIN: CPT

## 2024-09-23 RX ORDER — VIBEGRON 75 MG/1
75 TABLET, FILM COATED ORAL
Qty: 30 | Refills: 1 | Status: ACTIVE | COMMUNITY
Start: 2024-09-23 | End: 1900-01-01

## 2024-09-23 NOTE — ASSESSMENT
[FreeTextEntry1] : Patient is a 79-year-old female who I initially saw last year for complex left renal cyst noted on renal ultrasound.  I performed the MRI of the abdomen that showed it to be a simple cyst.  She has had no interval hematuria, dysuria or flank pain.  Over the last 3 months though her nocturia is gone from 1-2 times a night to 3-5 times a night disrupting her sleep pattern.  She has had no significant change in dietary intake and stops drinking fluids about 3 hours before going to bed.  She drinks mainly water and does not drink caffeine or carbonated products.  She does have Parkinson's but is been stable with no exacerbation in her neurologic symptoms.  Assessment plan 1.  Left renal cyst which is simple will observe 2.  Nocturia 3.  Parkinson's managed by neurology but may be contributing to the nocturia She takes minimal bladder irritants and stops drinking at a reasonable time before sleep.  I offered her pelvic floor therapy or a trial of Gemtesa 75 mg p.o. daily and wishes to try the medication.  She will follow-up in 1 month.  I did discuss the side effects and risks of benefits of the medication she understands and wishes to proceed.

## 2024-10-07 NOTE — HISTORY OF PRESENT ILLNESS
[FreeTextEntry1] : STarted on alendronate one year ago for declining bone density.  tolerating, no GI side effects on it.\par no history of fractures.  no FH  hip fracture but thinks mother had osteoporosis.  has lost one inch of height (used to be 5' 6")\par menopause at age 52.  Non smoker, no alcohol, no soda (some times ginger ale)\par no falls or problems with balance\par Trying to gain weight; used to be 120 lb  and now is 103.  appetite has been good\par She notices that her legs are much weaker than before\par losing hair, she thinks from stress -- taking care of her .\par got flu vaccine\par \par PMH: bronchiectasis\par MAC completed treatment 6/2020\par breast cancer s/p lumpectomy\par \par Meds:\par alendronate, started 2019\par calcium 600mg, 1 tab/day; vitamin D 5000/day\par omeprazole 20mg bid. \par levothyroxine 25mcg\par flaxseed, glucosamine chondroitin, lutein, probiotic, vit C, B complex\par  Emmanuel Lock  Obstetrics and Gynecology  [address] [place] [address], [address]  [place] [address]  Phone: [phone]  Fax: [phone]  Follow Up Time: 2 weeks

## 2024-10-10 ENCOUNTER — APPOINTMENT (OUTPATIENT)
Dept: PULMONOLOGY | Facility: CLINIC | Age: 79
End: 2024-10-10
Payer: MEDICARE

## 2024-10-10 VITALS
SYSTOLIC BLOOD PRESSURE: 125 MMHG | DIASTOLIC BLOOD PRESSURE: 70 MMHG | TEMPERATURE: 96 F | HEART RATE: 72 BPM | OXYGEN SATURATION: 91 % | BODY MASS INDEX: 15.83 KG/M2 | HEIGHT: 65 IN | WEIGHT: 95 LBS

## 2024-10-10 DIAGNOSIS — J47.9 BRONCHIECTASIS, UNCOMPLICATED: ICD-10-CM

## 2024-10-10 DIAGNOSIS — J96.11 CHRONIC RESPIRATORY FAILURE WITH HYPOXIA: ICD-10-CM

## 2024-10-10 PROCEDURE — 99214 OFFICE O/P EST MOD 30 MIN: CPT

## 2024-10-20 RX ORDER — BUDESONIDE 0.5 MG/2ML
0.5 INHALANT ORAL TWICE DAILY
Qty: 1 | Refills: 0 | Status: DISCONTINUED | COMMUNITY
Start: 2024-10-10 | End: 2024-10-20

## 2024-10-21 ENCOUNTER — RX CHANGE (OUTPATIENT)
Age: 79
End: 2024-10-21

## 2024-10-21 ENCOUNTER — RESULT REVIEW (OUTPATIENT)
Age: 79
End: 2024-10-21

## 2024-10-21 RX ORDER — BUDESONIDE 0.5 MG/2ML
0.5 INHALANT ORAL
Qty: 360 | Refills: 1 | Status: ACTIVE | COMMUNITY
Start: 1900-01-01 | End: 1900-01-01

## 2024-10-24 ENCOUNTER — APPOINTMENT (OUTPATIENT)
Dept: UROLOGY | Facility: CLINIC | Age: 79
End: 2024-10-24
Payer: MEDICARE

## 2024-10-24 DIAGNOSIS — R35.1 NOCTURIA: ICD-10-CM

## 2024-10-24 PROCEDURE — 99024 POSTOP FOLLOW-UP VISIT: CPT

## 2024-12-07 ENCOUNTER — TRANSCRIPTION ENCOUNTER (OUTPATIENT)
Age: 79
End: 2024-12-07

## 2025-01-16 ENCOUNTER — APPOINTMENT (OUTPATIENT)
Dept: ENDOCRINOLOGY | Facility: CLINIC | Age: 80
End: 2025-01-16
Payer: MEDICARE

## 2025-01-16 DIAGNOSIS — M81.0 AGE-RELATED OSTEOPOROSIS W/OUT CURRENT PATHOLOGICAL FRACTURE: ICD-10-CM

## 2025-01-16 PROCEDURE — 99213 OFFICE O/P EST LOW 20 MIN: CPT

## 2025-01-16 PROCEDURE — G2211 COMPLEX E/M VISIT ADD ON: CPT

## 2025-02-24 ENCOUNTER — APPOINTMENT (OUTPATIENT)
Dept: DERMATOLOGY | Facility: CLINIC | Age: 80
End: 2025-02-24

## 2025-03-18 ENCOUNTER — TRANSCRIPTION ENCOUNTER (OUTPATIENT)
Age: 80
End: 2025-03-18

## 2025-03-21 ENCOUNTER — APPOINTMENT (OUTPATIENT)
Dept: PAIN MANAGEMENT | Facility: CLINIC | Age: 80
End: 2025-03-21
Payer: MEDICARE

## 2025-03-21 VITALS
DIASTOLIC BLOOD PRESSURE: 61 MMHG | SYSTOLIC BLOOD PRESSURE: 136 MMHG | HEART RATE: 68 BPM | OXYGEN SATURATION: 92 % | HEIGHT: 65 IN | BODY MASS INDEX: 15.83 KG/M2 | WEIGHT: 95 LBS

## 2025-03-21 DIAGNOSIS — M47.814 SPONDYLOSIS W/OUT MYELOPATHY OR RADICULOPATHY, THORACIC REGION: ICD-10-CM

## 2025-03-21 PROCEDURE — 99203 OFFICE O/P NEW LOW 30 MIN: CPT

## 2025-03-21 RX ORDER — GABAPENTIN 100 MG/1
100 CAPSULE ORAL
Refills: 0 | Status: ACTIVE | COMMUNITY

## 2025-03-21 RX ORDER — GABAPENTIN 100 MG/1
100 CAPSULE ORAL
Qty: 30 | Refills: 2 | Status: ACTIVE | COMMUNITY
Start: 2025-03-21 | End: 1900-01-01

## 2025-03-21 RX ORDER — ESCITALOPRAM OXALATE 5 MG/1
5 TABLET, FILM COATED ORAL
Refills: 0 | Status: ACTIVE | COMMUNITY

## 2025-04-01 ENCOUNTER — TRANSCRIPTION ENCOUNTER (OUTPATIENT)
Age: 80
End: 2025-04-01

## 2025-04-14 ENCOUNTER — APPOINTMENT (OUTPATIENT)
Dept: PAIN MANAGEMENT | Facility: CLINIC | Age: 80
End: 2025-04-14
Payer: MEDICARE

## 2025-04-14 DIAGNOSIS — M79.10 MYALGIA, UNSPECIFIED SITE: ICD-10-CM

## 2025-04-14 PROCEDURE — 99212 OFFICE O/P EST SF 10 MIN: CPT | Mod: 2W

## 2025-04-29 RX ORDER — CALCIUM CITRATE/VITAMIN D3 315MG-6.25
TABLET ORAL
Refills: 0 | Status: ACTIVE | COMMUNITY

## 2025-05-13 ENCOUNTER — RX RENEWAL (OUTPATIENT)
Age: 80
End: 2025-05-13

## 2025-07-02 ENCOUNTER — APPOINTMENT (OUTPATIENT)
Dept: ENDOCRINOLOGY | Facility: CLINIC | Age: 80
End: 2025-07-02
Payer: MEDICARE

## 2025-07-02 PROCEDURE — 99214 OFFICE O/P EST MOD 30 MIN: CPT | Mod: 2W

## 2025-07-07 PROBLEM — E55.9 VITAMIN D DEFICIENCY: Status: ACTIVE | Noted: 2025-07-02

## 2025-07-17 RX ORDER — ERGOCALCIFEROL 1.25 MG/1
1.25 MG CAPSULE, LIQUID FILLED ORAL
Qty: 12 | Refills: 0 | Status: ACTIVE | COMMUNITY
Start: 2025-07-07 | End: 1900-01-01

## 2025-09-08 ENCOUNTER — LABORATORY RESULT (OUTPATIENT)
Age: 80
End: 2025-09-08

## 2025-09-08 ENCOUNTER — APPOINTMENT (OUTPATIENT)
Dept: DERMATOLOGY | Facility: CLINIC | Age: 80
End: 2025-09-08
Payer: MEDICARE

## 2025-09-08 VITALS — WEIGHT: 95 LBS | BODY MASS INDEX: 15.83 KG/M2 | HEIGHT: 65 IN

## 2025-09-08 DIAGNOSIS — D48.5 NEOPLASM OF UNCERTAIN BEHAVIOR OF SKIN: ICD-10-CM

## 2025-09-08 DIAGNOSIS — Z85.828 PERSONAL HISTORY OF OTHER MALIGNANT NEOPLASM OF SKIN: ICD-10-CM

## 2025-09-08 DIAGNOSIS — L82.1 OTHER SEBORRHEIC KERATOSIS: ICD-10-CM

## 2025-09-08 DIAGNOSIS — L82.0 INFLAMED SEBORRHEIC KERATOSIS: ICD-10-CM

## 2025-09-08 DIAGNOSIS — D22.9 MELANOCYTIC NEVI, UNSPECIFIED: ICD-10-CM

## 2025-09-08 DIAGNOSIS — L57.0 ACTINIC KERATOSIS: ICD-10-CM

## 2025-09-08 DIAGNOSIS — L21.9 SEBORRHEIC DERMATITIS, UNSPECIFIED: ICD-10-CM

## 2025-09-08 PROCEDURE — 11102 TANGNTL BX SKIN SINGLE LES: CPT | Mod: 59

## 2025-09-08 PROCEDURE — 17110 DESTRUCTION B9 LES UP TO 14: CPT

## 2025-09-08 PROCEDURE — 17000 DESTRUCT PREMALG LESION: CPT | Mod: 59

## 2025-09-08 PROCEDURE — 99204 OFFICE O/P NEW MOD 45 MIN: CPT | Mod: 25

## 2025-09-08 RX ORDER — KETOCONAZOLE 20 MG/ML
2 SHAMPOO TOPICAL
Qty: 1 | Refills: 2 | Status: ACTIVE | COMMUNITY
Start: 2025-09-08 | End: 1900-01-01

## 2025-09-15 ENCOUNTER — TRANSCRIPTION ENCOUNTER (OUTPATIENT)
Age: 80
End: 2025-09-15